# Patient Record
Sex: MALE | Race: WHITE | Employment: FULL TIME | URBAN - NONMETROPOLITAN AREA
[De-identification: names, ages, dates, MRNs, and addresses within clinical notes are randomized per-mention and may not be internally consistent; named-entity substitution may affect disease eponyms.]

---

## 2018-01-25 ENCOUNTER — AMBULATORY - GICH (OUTPATIENT)
Dept: SCHEDULING | Facility: OTHER | Age: 33
End: 2018-01-25

## 2018-02-08 ENCOUNTER — TRANSFERRED RECORDS (OUTPATIENT)
Dept: HEALTH INFORMATION MANAGEMENT | Facility: OTHER | Age: 33
End: 2018-02-08

## 2018-02-19 ENCOUNTER — TRANSFERRED RECORDS (OUTPATIENT)
Dept: HEALTH INFORMATION MANAGEMENT | Facility: OTHER | Age: 33
End: 2018-02-19

## 2018-03-05 ENCOUNTER — TRANSFERRED RECORDS (OUTPATIENT)
Dept: HEALTH INFORMATION MANAGEMENT | Facility: OTHER | Age: 33
End: 2018-03-05

## 2018-03-09 ENCOUNTER — DOCUMENTATION ONLY (OUTPATIENT)
Dept: FAMILY MEDICINE | Facility: OTHER | Age: 33
End: 2018-03-09

## 2018-03-09 PROBLEM — G47.00 INSOMNIA: Status: ACTIVE | Noted: 2018-03-09

## 2018-06-28 ENCOUNTER — TRANSFERRED RECORDS (OUTPATIENT)
Dept: HEALTH INFORMATION MANAGEMENT | Facility: OTHER | Age: 33
End: 2018-06-28

## 2018-07-02 ENCOUNTER — HOSPITAL ENCOUNTER (OUTPATIENT)
Dept: MRI IMAGING | Facility: OTHER | Age: 33
Discharge: HOME | End: 2018-07-02
Attending: PODIATRIST | Admitting: PODIATRIST
Payer: COMMERCIAL

## 2018-07-02 DIAGNOSIS — M79.672 FOOT PAIN, LEFT: ICD-10-CM

## 2018-07-02 PROCEDURE — 73720 MRI LWR EXTREMITY W/O&W/DYE: CPT | Mod: LT

## 2018-07-02 PROCEDURE — 25500064 ZZH RX 255 OP 636: Performed by: PODIATRIST

## 2018-07-02 PROCEDURE — A9575 INJ GADOTERATE MEGLUMI 0.1ML: HCPCS | Performed by: PODIATRIST

## 2018-07-02 RX ORDER — GADOTERATE MEGLUMINE 376.9 MG/ML
20 INJECTION INTRAVENOUS ONCE
Status: COMPLETED | OUTPATIENT
Start: 2018-07-02 | End: 2018-07-02

## 2018-07-02 RX ADMIN — GADOTERATE MEGLUMINE 16 ML: 376.9 INJECTION INTRAVENOUS at 13:24

## 2018-07-06 ENCOUNTER — TRANSFERRED RECORDS (OUTPATIENT)
Dept: HEALTH INFORMATION MANAGEMENT | Facility: OTHER | Age: 33
End: 2018-07-06

## 2018-09-23 ENCOUNTER — OFFICE VISIT (OUTPATIENT)
Dept: FAMILY MEDICINE | Facility: OTHER | Age: 33
End: 2018-09-23
Attending: FAMILY MEDICINE
Payer: COMMERCIAL

## 2018-09-23 VITALS
BODY MASS INDEX: 26.21 KG/M2 | TEMPERATURE: 98.2 F | HEART RATE: 72 BPM | SYSTOLIC BLOOD PRESSURE: 138 MMHG | DIASTOLIC BLOOD PRESSURE: 78 MMHG | WEIGHT: 187.9 LBS

## 2018-09-23 DIAGNOSIS — B37.2 YEAST DERMATITIS: ICD-10-CM

## 2018-09-23 PROCEDURE — 99213 OFFICE O/P EST LOW 20 MIN: CPT | Performed by: FAMILY MEDICINE

## 2018-09-23 ASSESSMENT — ANXIETY QUESTIONNAIRES
GAD7 TOTAL SCORE: 1
IF YOU CHECKED OFF ANY PROBLEMS ON THIS QUESTIONNAIRE, HOW DIFFICULT HAVE THESE PROBLEMS MADE IT FOR YOU TO DO YOUR WORK, TAKE CARE OF THINGS AT HOME, OR GET ALONG WITH OTHER PEOPLE: SOMEWHAT DIFFICULT
3. WORRYING TOO MUCH ABOUT DIFFERENT THINGS: NOT AT ALL
2. NOT BEING ABLE TO STOP OR CONTROL WORRYING: NOT AT ALL
7. FEELING AFRAID AS IF SOMETHING AWFUL MIGHT HAPPEN: NOT AT ALL
5. BEING SO RESTLESS THAT IT IS HARD TO SIT STILL: SEVERAL DAYS
1. FEELING NERVOUS, ANXIOUS, OR ON EDGE: NOT AT ALL
6. BECOMING EASILY ANNOYED OR IRRITABLE: NOT AT ALL

## 2018-09-23 ASSESSMENT — PATIENT HEALTH QUESTIONNAIRE - PHQ9: 5. POOR APPETITE OR OVEREATING: NOT AT ALL

## 2018-09-23 ASSESSMENT — PAIN SCALES - GENERAL: PAINLEVEL: NO PAIN (0)

## 2018-09-23 NOTE — PROGRESS NOTES
SUBJECTIVE:   Adan Daugherty is a 33 year old male who presents to clinic today for the following health issues: Rash patient arrives here for rash.    HPI Comments: Noticed that a couple days ago after working.  He states that he was in an environment where he was constantly wet.  It was hot.  Working at the [employer] and slowly developed a rash underneath his armpit and along his right waistband.  He used antifungal cream once and Benadryl cream a couple times.  Itchy.        Patient Active Problem List    Diagnosis Date Noted     Insomnia 03/09/2018     Priority: Medium     Acid reflux 08/20/2013     Priority: Medium     Abdominal pain, acute 02/14/2012     Priority: Medium     Anxiety state 07/07/2011     Priority: Medium     Otitis media, chronic 07/07/2011     Priority: Medium     Posttraumatic stress disorder 07/07/2011     Priority: Medium     Seborrhea capitis 07/07/2011     Priority: Medium     Backache 10/25/2010     Priority: Medium     Pain in joint, lower leg 10/25/2010     Priority: Medium     Nonallopathic lesion of pelvic region 10/25/2010     Priority: Medium     Nonallopathic lesion of lumbar region 10/25/2010     Priority: Medium     Nonallopathic lesion of thoracic region 10/25/2010     Priority: Medium     Past Medical History:   Diagnosis Date     Pain in joint, lower leg     hx of     Personal history of other specified diseases     No Comments Provided      Past Surgical History:   Procedure Laterality Date     OTHER SURGICAL HISTORY      [id number],MASTOIDECTOMY,and reconstruction, multiple ear surgeries     No current outpatient prescriptions on file.     Allergies   Allergen Reactions     Amoxicillin Unknown     Penicillins Unknown       Review of Systems     OBJECTIVE:     /78 (BP Location: Right arm, Patient Position: Sitting, Cuff Size: Adult Regular)  Pulse 72  Temp 98.2  F (36.8  C) (Tympanic)  Wt 187 lb 14.4 oz (85.2 kg)  BMI 26.21 kg/m2  Body mass index is 26.21  kg/(m^2).  Physical Exam   Skin:   Areas of erythema with punctate lesions underneath both axilla and around his right waist       none     ASSESSMENT/PLAN:         1. Yeast dermatitis  Exam is most consistent with yeast dermatitis especially in light of his history.  Recommended antifungal cream.  Cautiously use hydrocortisone cream.  Follow-up if getting worse        Brandt Linda MD  [facility] AND \[facility]\""

## 2018-09-23 NOTE — MR AVS SNAPSHOT
After Visit Summary   9/23/2018    Adan Daugherty    MRN: [id number]           Patient Information     Date Of Birth          1985        Visit Information        Provider Department      9/23/2018 10:15 AM Brandt Linda MD [facility]        Today's Diagnoses     Yeast dermatitis           Follow-ups after your visit        Who to contact     If you have questions or need follow up information about today's clinic visit or your schedule please contact [facility] directly at [phone].  Normal or non-critical lab and imaging results will be communicated to you by MyChart, letter or phone within 4 business days after the clinic has received the results. If you do not hear from us within 7 days, please contact the clinic through MyChart or phone. If you have a critical or abnormal lab result, we will notify you by phone as soon as possible.  Submit refill requests through [email or web address] or call your pharmacy and they will forward the refill request to us. Please allow 3 business days for your refill to be completed.          Additional Information About Your Visit        Care EveryWhere ID     This is your Care EveryWhere ID. This could be used by other organizations to access your [facility] medical records  [id number]        Your Vitals Were     Pulse Temperature BMI (Body Mass Index)             72 98.2  F (36.8  C) (Tympanic) 26.21 kg/m2          Blood Pressure from Last 3 Encounters:   09/23/18 138/78   01/05/16 138/86   08/16/15 131/90    Weight from Last 3 Encounters:   09/23/18 187 lb 14.4 oz (85.2 kg)   01/05/16 189 lb (85.7 kg)   08/16/15 196 lb (88.9 kg)              Today, you had the following     No orders found for display       Primary Care Provider Office Phone # Fax #    Dewayne Schofield [phone] [address] [place] [facility] [address]        Equal Access to Services     MADELEINE ESTRELLA AH: Lenka aleman  mansoor Zepeda, araseli guzmán, leandro nissadanay padminisia, arianne reemain hayaaanahi washingtoneileen felipetyler laKofibridger shaun. So [facility] [phone].    ATENCIÓN: Si habla español, tiene a matos disposición servicios gratuitos de asistencia lingüística. Llame al [phone].    We comply with applicable federal civil rights laws and [place] laws. We do not discriminate on the basis of race, color, national origin, age, disability, sex, sexual orientation, or gender identity.            Thank you!     Thank you for choosing [facility] AND [facility]  for your care. Our goal is always to provide you with excellent care. Hearing back from our patients is one way we can continue to improve our services. Please take a few minutes to complete the written survey that you may receive in the mail after your visit with us. Thank you!             Your Updated Medication List - Protect others around you: Learn how to safely use, store and throw away your medicines at www.disposemymeds.org.      Notice  As of 9/23/2018 10:52 AM    You have not been prescribed any medications.

## 2018-09-24 ASSESSMENT — ANXIETY QUESTIONNAIRES: GAD7 TOTAL SCORE: 1

## 2018-09-25 ENCOUNTER — OFFICE VISIT (OUTPATIENT)
Dept: FAMILY MEDICINE | Facility: OTHER | Age: 33
End: 2018-09-25
Attending: NURSE PRACTITIONER
Payer: COMMERCIAL

## 2018-09-25 VITALS
TEMPERATURE: 97.3 F | BODY MASS INDEX: 27.16 KG/M2 | RESPIRATION RATE: 16 BRPM | HEART RATE: 83 BPM | HEIGHT: 70 IN | WEIGHT: 189.7 LBS | OXYGEN SATURATION: 98 %

## 2018-09-25 DIAGNOSIS — B37.2 YEAST DERMATITIS: Primary | ICD-10-CM

## 2018-09-25 PROCEDURE — 99213 OFFICE O/P EST LOW 20 MIN: CPT | Performed by: NURSE PRACTITIONER

## 2018-09-25 RX ORDER — NYSTATIN 100000 [USP'U]/G
POWDER TOPICAL 2 TIMES DAILY
Qty: 60 G | Refills: 0 | Status: SHIPPED | OUTPATIENT
Start: 2018-09-25 | End: 2019-04-22

## 2018-09-25 RX ORDER — FLUCONAZOLE 150 MG/1
150 TABLET ORAL
Qty: 2 TABLET | Refills: 0 | Status: SHIPPED | OUTPATIENT
Start: 2018-09-25 | End: 2019-04-22

## 2018-09-25 RX ORDER — NYSTATIN AND TRIAMCINOLONE ACETONIDE 100000; 1 [USP'U]/G; MG/G
OINTMENT TOPICAL 2 TIMES DAILY
Qty: 90 G | Refills: 1 | Status: SHIPPED | OUTPATIENT
Start: 2018-09-25 | End: 2019-04-22

## 2018-09-25 ASSESSMENT — PAIN SCALES - GENERAL: PAINLEVEL: NO PAIN (0)

## 2018-09-25 NOTE — MR AVS SNAPSHOT
After Visit Summary   9/25/2018    Adan Daugherty    MRN: [id number]           Patient Information     Date Of Birth          1985        Visit Information        Provider Department      9/25/2018 5:30 PM [facility] NURSE  [facility]        Today's Diagnoses     Yeast dermatitis    -  1      Care Instructions      Candida Skin Infection (Adult)  Candida is type of yeast. It grows naturally on the skin and in the mouth. If it grows out of control, it can cause an infection. Candida can cause infections in the genital area, skin folds, in the mouth, and under the breasts. Anyone can get this infection. It is more common in a person with a weak immune system, such as from diabetes, HIV, or cancer. It s also more common in someone who has been on antibiotic therapy. And it s more common people who are overweight or who have incontinence. Wearing tight-fitting clothing and taking part in activities with lots of skin-to-skin contact can also put you at risk.  Candida causes the skin to become bright red and inflamed. The border of the infected part of the skin is often raised. The infection causes pain and itching. Sometimes the skin peels and bleeds. In the mouth, candida is called thrush, and may cause white thickened areas.  A Candida rash is most often treated with an antifungal cream or ointment. The rash will clear a few days after starting the medicine. Infections that don t go away may need a prescription medicine. In rare cases, a bacterial infection can also occur.  Home care  Your healthcare provider will recommend an antifungal cream or ointment for the rash. He or she may also prescribe a medicine for the itch. Follow all instructions for using these medicines. Don t use cornstarch powder. Cornstarch can cause the Candida infection to get worse.  General care:    Keep your skin clean by washing the area twice a day.    Use the cream as directed until your rash is gone.  "Once the skin has healed, keep it dry to prevent another infection.     If you are overweight, talk with your healthcare provider about a plan to lose excess weight.    Avoid clothes that fit tightly.  Follow-up care  Follow up with your healthcare provider, or as advised. Your rash will clear in 7 to 14 days. Call your healthcare provider if the rash is not gone after 14 days.  When to seek medical advice  Call your healthcare provider right away if any of these occur:    Pain or redness that gets worse or spreads    Fluid coming from the skin    Yellow crusts on the skin    Fever of 100.4 F (38 C) or higher, or as directed by your healthcare provider                  Follow-ups after your visit        Who to contact     If you have questions or need follow up information about today's clinic visit or your schedule please contact [facility] directly at [phone].  Normal or non-critical lab and imaging results will be communicated to you by MyChart, letter or phone within 4 business days after the clinic has received the results. If you do not hear from us within 7 days, please contact the clinic through MyChart or phone. If you have a critical or abnormal lab result, we will notify you by phone as soon as possible.  Submit refill requests through [employer] or call your pharmacy and they will forward the refill request to us. Please allow 3 business days for your refill to be completed.          Additional Information About Your Visit        Care EveryWhere ID     This is your Care EveryWhere ID. This could be used by other organizations to access your [facility] medical records  [id number]        Your Vitals Were     Pulse Temperature Respirations Height Pulse Oximetry BMI (Body Mass Index)    83 97.3  F (36.3  C) (Tympanic) 16 5' 10.08\" (1.78 m) 98% 27.16 kg/m2       Blood Pressure from Last 3 Encounters:   09/23/18 138/78   01/05/16 138/86   08/16/15 131/90    Weight from Last 3 " Encounters:   09/25/18 189 lb 11.2 oz (86 kg)   09/23/18 187 lb 14.4 oz (85.2 kg)   01/05/16 189 lb (85.7 kg)              Today, you had the following     No orders found for display         Today's Medication Changes          These changes are accurate as of 9/25/18  6:02 PM.  If you have any questions, ask your nurse or doctor.               Start taking these medicines.        Dose/Directions    fluconazole 150 MG tablet   Commonly known as:  DIFLUCAN   Used for:  Yeast dermatitis        Dose:  150 mg   Take 1 tablet (150 mg) by mouth every 3 days   Quantity:  2 tablet   Refills:  0       nystatin 884629 UNIT/GM Powd   Commonly known as:  MYCOSTATIN   Used for:  Yeast dermatitis        Apply topically 2 times daily   Quantity:  60 g   Refills:  0       nystatin-triamcinolone ointment   Commonly known as:  MYCOLOG   Used for:  Yeast dermatitis        Apply topically 2 times daily For around 10 days.   Quantity:  90 g   Refills:  1            Where to get your medicines      These medications were sent to [id number] - [place], [place] - [address]  [address] [address]     Phone:  [phone]     fluconazole 150 MG tablet    nystatin [id number] UNIT/GM Powd    nystatin-triamcinolone ointment                Primary Care Provider Office Phone # Fax #    Dewayne Schofield [phone] [address] [facility] [address]        Equal Access to Services     [facility]: Hadii jose antonio rodriguezo Sorossali, waaxda luqadaha, qaybta kaalmada adeegyada, arianne dunn. So [facility] [phone].    ATENCIÓN: Si habla jeremy, tiene a matos disposición servicios gratuitos de asistencia lingüística. Julisa al [phone].    We comply with applicable federal civil rights laws and [place] laws. We do not discriminate on the basis of race, color, national origin, age, disability, sex, sexual orientation, or gender  identity.            Thank you!     Thank you for choosing [facility] AND [facility]  for your care. Our goal is always to provide you with excellent care. Hearing back from our patients is one way we can continue to improve our services. Please take a few minutes to complete the written survey that you may receive in the mail after your visit with us. Thank you!             Your Updated Medication List - Protect others around you: Learn how to safely use, store and throw away your medicines at www.disposemymeds.org.          This list is accurate as of 9/25/18  6:02 PM.  Always use your most recent med list.                   Brand Name Dispense Instructions for use Diagnosis    fluconazole 150 MG tablet    DIFLUCAN    2 tablet    Take 1 tablet (150 mg) by mouth every 3 days    Yeast dermatitis       nystatin [id number] UNIT/GM Powd    MYCOSTATIN    60 g    Apply topically 2 times daily    Yeast dermatitis       nystatin-triamcinolone ointment    MYCOLOG    90 g    Apply topically 2 times daily For around 10 days.    Yeast dermatitis

## 2018-09-25 NOTE — NURSING NOTE
Patient presents with rash on arms and waist. Patient saw Brandt Linda MD on the 23rd and was told it is fungal. Patient states it is itching more, spreading, redder. Patient has been using [facility] brand antifungal with no relief. Aliya Malone LPN .............9/25/2018  5:21 PM  Medication Reconciliation: complete    Aliya Malone LPN  ..................9/25/2018   5:21 PM

## 2018-09-25 NOTE — PROGRESS NOTES
"Nursing Notes:   Aliya Malone LPN  9/25/2018  5:22 PM  Unsigned  Patient presents with rash on arms and waist. Patient saw Brandt Linda MD on the 23rd and was told it is fungal. Patient states it is itching more, spreading, redder. Patient has been using [facility] brand antifungal with no relief. Aliya Malone LPN .............9/25/2018  5:21 PM  Medication Reconciliation: complete    Aliya Malone LPN  ..................9/25/2018   5:21 PM    SUBJECTIVE:   Adan Daugherty is a 33 year old male who presents to clinic today for the following health issues:    Rash      Duration: 5 days    Description  Location: Under arms, in groins, and over hips.   Itching: moderate    Intensity:  moderate    Accompanying signs and symptoms: Itching. Getting worse.     History (similar episodes/previous evaluation): None    Precipitating or alleviating factors:  New exposures:  None  Recent travel: no      Therapies tried and outcome: hydrocortisone cream -  not effective and Antifungal: not effective.       Problem list and histories reviewed & adjusted, as indicated.  Additional history: as documented    Current Outpatient Prescriptions   Medication Sig Dispense Refill     fluconazole (DIFLUCAN) 150 MG tablet Take 1 tablet (150 mg) by mouth every 3 days 2 tablet 0     nystatin (MYCOSTATIN) 115321 UNIT/GM POWD Apply topically 2 times daily 60 g 0     nystatin-triamcinolone (MYCOLOG) ointment Apply topically 2 times daily For around 10 days. 90 g 1     Allergies   Allergen Reactions     Amoxicillin Unknown     Penicillins Unknown         ROS:  Notable findings in the HPI.       OBJECTIVE:     Pulse 83  Temp 97.3  F (36.3  C) (Tympanic)  Resp 16  Ht 5' 10.08\" (1.78 m)  Wt 189 lb 11.2 oz (86 kg)  SpO2 98%  BMI 27.16 kg/m2  Body mass index is 27.16 kg/(m^2).  GENERAL: healthy, alert and no distress  EYES: Eyes grossly normal to inspection  HENT: normal cephalic/atraumatic and oral mucous membranes " moist  RESP: without increased work of breathing.   SKIN: Examination of the rash reveals: Candida: Patches of flat, intensely inflamed, well-defined, clustered bright red macules under the arm pits, over lower abd, and in groins  PSYCH: mentation appears normal, affect normal/bright    Diagnostic Test Results:  none     ASSESSMENT/PLAN:     1. Yeast dermatitis  - fluconazole (DIFLUCAN) 150 MG tablet; Take 1 tablet (150 mg) by mouth every 3 days  Dispense: 2 tablet; Refill: 0  - nystatin (MYCOSTATIN) 293747 UNIT/GM POWD; Apply topically 2 times daily  Dispense: 60 g; Refill: 0  - nystatin-triamcinolone (MYCOLOG) ointment; Apply topically 2 times daily For around 10 days.  Dispense: 90 g; Refill: 1    PLAN:    Rash:  moisturizer  Reassurance was given to the patient  Rx  F/U in 72 hours if not improving could be more contact derm under arms. But will treat as yeast as this fits his job and past exam note.     Followup:    If not improving or if condition worsens, follow up with your Primary Care Provider    I explained my diagnostic considerations and recommendations to the patient, who voiced understanding and agreement with the treatment plan. All questions were answered. We discussed potential side effects of any prescribed or recommended therapies, as well as expectations for response to treatments. He was advised to contact our office if there is no improvement or worsening of conditions or symptoms.  If s/s worsen or persist, patient will either come back or follow up with PCP.    Disclaimer:  This note consists of words and symbols derived from keyboarding, dictation, or using voice recognition software. As a result, there may be errors in the script that have gone undetected. Please consider this when interpreting information found in this note.      Kacie Ag NP, 9/25/2018 5:26 PM

## 2018-09-30 ENCOUNTER — OFFICE VISIT (OUTPATIENT)
Dept: FAMILY MEDICINE | Facility: OTHER | Age: 33
End: 2018-09-30
Payer: COMMERCIAL

## 2018-09-30 VITALS
TEMPERATURE: 97.5 F | OXYGEN SATURATION: 98 % | BODY MASS INDEX: 27.39 KG/M2 | DIASTOLIC BLOOD PRESSURE: 64 MMHG | SYSTOLIC BLOOD PRESSURE: 150 MMHG | WEIGHT: 191.3 LBS | HEART RATE: 44 BPM

## 2018-09-30 DIAGNOSIS — H72.92 RUPTURE OF LEFT TYMPANIC MEMBRANE: Primary | ICD-10-CM

## 2018-09-30 PROCEDURE — 99213 OFFICE O/P EST LOW 20 MIN: CPT | Performed by: NURSE PRACTITIONER

## 2018-09-30 RX ORDER — CIPROFLOXACIN AND DEXAMETHASONE 3; 1 MG/ML; MG/ML
4 SUSPENSION/ DROPS AURICULAR (OTIC) 2 TIMES DAILY
Qty: 1 BOTTLE | Refills: 0 | Status: SHIPPED | OUTPATIENT
Start: 2018-09-30 | End: 2018-10-07

## 2018-09-30 ASSESSMENT — PAIN SCALES - GENERAL: PAINLEVEL: SEVERE PAIN (6)

## 2018-09-30 NOTE — PROGRESS NOTES
Nursing Notes:   Rachel Perez CMA  9/30/2018 12:09 PM  Addendum  Patient presents to clinic today for a possible ear infection. Patient was swimming last night. Patient believes he perforated his ear drum. He has an artificial ear drum.  Rachel Perez CMA..............9/30/2018........12:06 PM    Medication Reconciliation: complete    Rachel Perez CMA        SUBJECTIVE:   Adan Daugherty is a 33 year old male who presents to clinic today for the following health issues:    Ear pain      Duration: Yesterday, last night    Description  ear pain left    Severity: moderate    Accompanying signs and symptoms: no fevers or chills, has drainage.     History (predisposing factors):  history of recurrent otitis media and HX of TM repair for perforation     Precipitating or alleviating factors: None    Therapies tried and outcome:  none        Problem list and histories reviewed & adjusted, as indicated.  Additional history: as documented    Current Outpatient Prescriptions   Medication Sig Dispense Refill     ciprofloxacin-dexamethasone (CIPRODEX) otic suspension Place 4 drops Into the left ear 2 times daily for 7 days 1 Bottle 0     fluconazole (DIFLUCAN) 150 MG tablet Take 1 tablet (150 mg) by mouth every 3 days 2 tablet 0     nystatin (MYCOSTATIN) 204439 UNIT/GM POWD Apply topically 2 times daily 60 g 0     nystatin-triamcinolone (MYCOLOG) ointment Apply topically 2 times daily For around 10 days. 90 g 1     Allergies   Allergen Reactions     Amoxicillin Unknown     Penicillins Unknown         ROS:  Notable findings in the HPI.       OBJECTIVE:     /64  Pulse (!) 44  Temp 97.5  F (36.4  C) (Tympanic)  Wt 191 lb 4.8 oz (86.8 kg)  SpO2 98%  BMI 27.39 kg/m2  Body mass index is 27.39 kg/(m^2).  GENERAL: healthy, alert and no distress  EYES: Eyes grossly normal to inspection  HENT: normal cephalic/atraumatic, right ear: normal: no effusions, no erythema, normal landmarks, left ear: erythematous and  perforation, nose and mouth without ulcers or lesions, oropharynx clear and oral mucous membranes moist  NECK: no adenopathy  RESP: without increased work of breathing.     Diagnostic Test Results:  none     ASSESSMENT/PLAN:     1. Rupture of left tympanic membrane  - ciprofloxacin-dexamethasone (CIPRODEX) otic suspension; Place 4 drops Into the left ear 2 times daily for 7 days  Dispense: 1 Bottle; Refill: 0    PLAN:    URI Adult:  Tylenol, Ibuprofen, Fluids, Rest and RX drops as directed.     Followup:    If not improving or if condition worsens, follow up with your Primary Care Provider    I explained my diagnostic considerations and recommendations to the patient, who voiced understanding and agreement with the treatment plan. All questions were answered. We discussed potential side effects of any prescribed or recommended therapies, as well as expectations for response to treatments. He was advised to contact our office if there is no improvement or worsening of conditions or symptoms.  If s/s worsen or persist, patient will either come back or follow up with PCP.    Disclaimer:  This note consists of words and symbols derived from keyboarding, dictation, or using voice recognition software. As a result, there may be errors in the script that have gone undetected. Please consider this when interpreting information found in this note.      Kacie Ag NP, 9/30/2018 12:25 PM

## 2018-09-30 NOTE — PATIENT INSTRUCTIONS
Eardrum Rupture (Perforation)    Your eardrum is a thin membrane between your outer and middle ear. Sound waves entering your ear cause the membrane to vibrate. This helps you hear. An injury or infection can cause your eardrum to tear (rupture). This creates a hole (perforation) that may affect your hearing.  Causes of eardrum perforation  Causes of a ruptured eardrum include:    Pressure from an ear infection    Putting an object such as a cotton swab or pencil into the ear    A very loud noise such as a gunshot close to the ear    Rapid changes in air pressure. These can happen during scuba diving or traveling at high altitudes.    A slap or blow to the ear  When to go to the emergency room (ER)  Seek medical care right away if you:    Have severe pain, bleeding, or ringing in your ear.    Lose your hearing suddenly.    Become very dizzy for no reason.    Have an object lodged in your ear.  A ruptured eardrum from an ear infection usually isn't an emergency. In fact, the rupture often relieves pressure and pain. It usually heals within hours or days. But you should have the ear looked at by a healthcare provider within 24 hours.  What to expect in the ER  Your ear will be examined. Treatment will depend on how severe the damage is. Small holes often heal on their own. A small patch may be placed over a minor eardrum tear. Large tears may need to be repaired during an operation. If you are very dizzy or have severe hearing loss, you are likely to stay in the hospital for treatment for one or more days.  Don't clean inside the ear canal with cotton swabs or any other object.

## 2018-09-30 NOTE — NURSING NOTE
Patient presents to clinic today for a possible ear infection. Patient was swimming last night. Patient believes he perforated his ear drum. He has an artificial ear drum.  Rachel Perez CMA..............9/30/2018........12:06 PM    Medication Reconciliation: complete    Rachel Perez CMA

## 2018-09-30 NOTE — MR AVS SNAPSHOT
After Visit Summary   9/30/2018    Adan Daugherty    MRN: [id number]           Patient Information     Date Of Birth          1985        Visit Information        Provider Department      9/30/2018 12:00 PM Kacie Ag NP [facility]        Today's Diagnoses     Rupture of left tympanic membrane    -  1      Care Instructions      Eardrum Rupture (Perforation)    Your eardrum is a thin membrane between your outer and middle ear. Sound waves entering your ear cause the membrane to vibrate. This helps you hear. An injury or infection can cause your eardrum to tear (rupture). This creates a hole (perforation) that may affect your hearing.  Causes of eardrum perforation  Causes of a ruptured eardrum include:    Pressure from an ear infection    Putting an object such as a cotton swab or pencil into the ear    A very loud noise such as a gunshot close to the ear    Rapid changes in air pressure. These can happen during scuba diving or traveling at high altitudes.    A slap or blow to the ear  When to go to the emergency room (ER)  Seek medical care right away if you:    Have severe pain, bleeding, or ringing in your ear.    Lose your hearing suddenly.    Become very dizzy for no reason.    Have an object lodged in your ear.  A ruptured eardrum from an ear infection usually isn't an emergency. In fact, the rupture often relieves pressure and pain. It usually heals within hours or days. But you should have the ear looked at by a healthcare provider within 24 hours.  What to expect in the ER  Your ear will be examined. Treatment will depend on how severe the damage is. Small holes often heal on their own. A small patch may be placed over a minor eardrum tear. Large tears may need to be repaired during an operation. If you are very dizzy or have severe hearing loss, you are likely to stay in the hospital for treatment for one or more days.  Don't clean inside the ear canal with  "cotton swabs or any other object.                   Follow-ups after your visit        Who to contact     If you have questions or need follow up information about today's clinic visit or your schedule please contact [facility] directly at [phone].  Normal or non-critical lab and imaging results will be communicated to you by [employer], letter or phone within 4 business days after the clinic has received the results. If you do not hear from us within 7 days, please contact the clinic through [employer] or phone. If you have a critical or abnormal lab result, we will notify you by phone as soon as possible.  Submit refill requests through [employer] or call your pharmacy and they will forward the refill request to us. Please allow 3 business days for your refill to be completed.          Additional Information About Your Visit        [employer] Information     [employer] lets you send messages to your doctor, view your test results, renew your prescriptions, schedule appointments and more. To sign up, go to [email or web address] . Click on \"Log in\" on the left side of the screen, which will take you to the Welcome page. Then click on \"Sign up Now\" on the right side of the page.     You will be asked to enter the access code listed below, as well as some personal information. Please follow the directions to create your username and password.     Your access code is: [email or web address]  Expires: 2018 12:34 PM     Your access code will  in 90 days. If you need help or a new code, please call your [facility] clinic or [phone].        Care EveryWhere ID     This is your Care EveryWhere ID. This could be used by other organizations to access your [facility] medical records  [id number]        Your Vitals Were     Pulse Temperature Pulse Oximetry BMI (Body Mass Index)          44 97.5  F (36.4  C) (Tympanic) 98% 27.39 kg/m2         Blood Pressure from Last 3 Encounters:   18 150/64   18 " 138/78   01/05/16 138/86    Weight from Last 3 Encounters:   09/30/18 191 lb 4.8 oz (86.8 kg)   09/25/18 189 lb 11.2 oz (86 kg)   09/23/18 187 lb 14.4 oz (85.2 kg)              Today, you had the following     No orders found for display         Today's Medication Changes          These changes are accurate as of 9/30/18 12:34 PM.  If you have any questions, ask your nurse or doctor.               Start taking these medicines.        Dose/Directions    ciprofloxacin-dexamethasone otic suspension   Commonly known as:  CIPRODEX   Used for:  Rupture of left tympanic membrane   Started by:  Kacei Ag NP        Dose:  4 drop   Place 4 drops Into the left ear 2 times daily for 7 days   Quantity:  1 Bottle   Refills:  0            Where to get your medicines      These medications were sent to [id number] - [place], [place] - [address]  [address], [facility] [address]     Phone:  [phone]     ciprofloxacin-dexamethasone otic suspension                Primary Care Provider Office Phone # Fax #    Dewayne Schofield [phone]       [address]  [facility] [address]        Equal Access to Services     MADELEINE ESTRELLA AH: Hadii jose antonio aleman hadasho Soomaali, waaxda luqadaha, qaybta kaalmada adeegyada, arianne dunn. So [facility] [phone].    ATENCIÓN: Si habla español, tiene a matos disposición servicios gratuitos de asistencia lingüística. Llame al [phone].    We comply with applicable federal civil rights laws and [place] laws. We do not discriminate on the basis of race, color, national origin, age, disability, sex, sexual orientation, or gender identity.            Thank you!     Thank you for choosing [facility]  for your care. Our goal is always to provide you with excellent care. Hearing back from our patients is one way we can continue to improve our services. Please take a few  minutes to complete the written survey that you may receive in the mail after your visit with us. Thank you!             Your Updated Medication List - Protect others around you: Learn how to safely use, store and throw away your medicines at www.disposemymeds.org.          This list is accurate as of 9/30/18 12:34 PM.  Always use your most recent med list.                   Brand Name Dispense Instructions for use Diagnosis    ciprofloxacin-dexamethasone otic suspension    CIPRODEX    1 Bottle    Place 4 drops Into the left ear 2 times daily for 7 days    Rupture of left tympanic membrane       fluconazole 150 MG tablet    DIFLUCAN    2 tablet    Take 1 tablet (150 mg) by mouth every 3 days    Yeast dermatitis       nystatin 809170 UNIT/GM Powd    MYCOSTATIN    60 g    Apply topically 2 times daily    Yeast dermatitis       nystatin-triamcinolone ointment    MYCOLOG    90 g    Apply topically 2 times daily For around 10 days.    Yeast dermatitis

## 2019-04-22 ENCOUNTER — OFFICE VISIT (OUTPATIENT)
Dept: FAMILY MEDICINE | Facility: OTHER | Age: 34
End: 2019-04-22
Attending: NURSE PRACTITIONER
Payer: COMMERCIAL

## 2019-04-22 VITALS
HEART RATE: 48 BPM | RESPIRATION RATE: 14 BRPM | HEIGHT: 71 IN | WEIGHT: 191.5 LBS | TEMPERATURE: 97.2 F | BODY MASS INDEX: 26.81 KG/M2 | SYSTOLIC BLOOD PRESSURE: 132 MMHG | DIASTOLIC BLOOD PRESSURE: 82 MMHG

## 2019-04-22 DIAGNOSIS — R23.8 SKIN IRRITATION: Primary | ICD-10-CM

## 2019-04-22 PROCEDURE — 99214 OFFICE O/P EST MOD 30 MIN: CPT | Performed by: NURSE PRACTITIONER

## 2019-04-22 RX ORDER — FLUCONAZOLE 150 MG/1
TABLET ORAL
Qty: 2 TABLET | Refills: 0 | Status: SHIPPED | OUTPATIENT
Start: 2019-04-22 | End: 2019-07-30

## 2019-04-22 ASSESSMENT — ENCOUNTER SYMPTOMS
MUSCULOSKELETAL NEGATIVE: 1
CONSTITUTIONAL NEGATIVE: 1
GASTROINTESTINAL NEGATIVE: 1

## 2019-04-22 ASSESSMENT — MIFFLIN-ST. JEOR: SCORE: 1835.77

## 2019-04-22 ASSESSMENT — PAIN SCALES - GENERAL: PAINLEVEL: NO PAIN (0)

## 2019-04-23 NOTE — PATIENT INSTRUCTIONS
Apply topical OTC antifungal cream, then apply Aquaphor ointment over the top of this 2 times a day x 10 days.   F/u with PCP in 5-7 days if not improving or sooner if symptoms worsen.

## 2019-04-23 NOTE — NURSING NOTE
Patient presents to the clinic for rash located to arms, waist and groin.     Medication Reconciliation: complete   Ailin Mcbride LPN............. April 22, 2019 7:03 PM

## 2019-05-14 NOTE — PROGRESS NOTES
SUBJECTIVE:   Adan Daugherty is a 34 year old male who presents to clinic today for the following health issues:    HPI   Presents with a rash to his axilla, groin and hip since yesterday. Had been 5 days since swimming and staying in the hotes. Had a similar rash last year after going to the same hotel. Applied antifungal treatments at home. No fever, open areas or drainage.     Patient Active Problem List    Diagnosis Date Noted     Yeast dermatitis 2018     Priority: Medium     Insomnia 2018     Priority: Medium     Acid reflux 2013     Priority: Medium     Anxiety state 2011     Priority: Medium     Otitis media, chronic 2011     Priority: Medium     Posttraumatic stress disorder 2011     Priority: Medium     Seborrhea capitis 2011     Priority: Medium     Nonallopathic lesion of pelvic region 10/25/2010     Priority: Medium     Nonallopathic lesion of lumbar region 10/25/2010     Priority: Medium     Nonallopathic lesion of thoracic region 10/25/2010     Priority: Medium     Past Medical History:   Diagnosis Date     Pain in joint, lower leg     hx of     Personal history of other specified diseases     No Comments Provided      Past Surgical History:   Procedure Laterality Date     OTHER SURGICAL HISTORY      [id number],MASTOIDECTOMY,and reconstruction, multiple ear surgeries     Family History   Problem Relation Age of Onset     Other - See Comments Father         Psychiatric illness,bipolar     Social History     Tobacco Use     Smoking status: Former Smoker     Packs/day: 1.00     Years: 8.00     Pack years: 8.00     Types: Cigarettes     Last attempt to quit: 2010     Years since quittin.8     Smokeless tobacco: Current User     Types: Chew     Tobacco comment: Quit smoking: Quit 3 years ago   Substance Use Topics     Alcohol use: Yes     Comment: occasional     Social History     Social History Narrative    Patient is a former smoker- smoked 10 yrs. Quit 1  "yr ago.     Regular Exercise - no.  \"physicallly active\"-- to [employer].  One child with her and one stepchild.  The stepchild biologic father has gotten custody.  His wife was sev    erely ill with blastomycosis spring of 2011.  She is recovering.  She had CNS involvement.     Current Outpatient Medications   Medication Sig Dispense Refill     fluconazole (DIFLUCAN) 150 MG tablet Take 1 tab now and 1 tab in 3 days 2 tablet 0       Review of Systems   Constitutional: Negative.    Gastrointestinal: Negative.    Genitourinary: Negative.    Musculoskeletal: Negative.    Skin: Positive for rash.        OBJECTIVE:     /82 (BP Location: Left arm, Patient Position: Sitting, Cuff Size: Adult Regular)   Pulse (!) 48   Temp 97.2  F (36.2  C) (Tympanic)   Resp 14   Ht 1.803 m (5' 11\")   Wt 86.9 kg (191 lb 8 oz)   BMI 26.71 kg/m    Body mass index is 26.71 kg/m .  Physical Exam   Constitutional: He appears well-developed and well-nourished.   HENT:   Head: Normocephalic and atraumatic.   Eyes: Conjunctivae are normal. No scleral icterus.   Cardiovascular: Normal rate.   Pulmonary/Chest: Effort normal.   Musculoskeletal: Normal range of motion.   Neurological: He is alert.   Skin: Skin is warm and intact. Rash noted. No abrasion, no ecchymosis and no petechiae noted. Rash is maculopapular (Fine, erythematous scattered in noted areas). He is not diaphoretic.        Rash has no odor, no moisture, no open lesions, no swelling.    Nursing note and vitals reviewed.      Diagnostic Test Results:  No results found for this or any previous visit (from the past 24 hour(s)).    ASSESSMENT/PLAN:       ICD-10-CM    1. Skin irritation R23.8 fluconazole (DIFLUCAN) 150 MG tablet     Patient's rash appears to like a contact dermatitis, although the locations are consistent with a fungal cause and he does have a history of yeast dermatitis.  Does not have a cellulitis appearance or fluctuance.  Will treat " with Diflucan and topical antifungals.  Advised patient to moisturize area as well.  Monitor if symptoms and follow-up with primary care if not improving.  Given epic education materials.    I explained my diagnostic considerations and recommendations to pt who voiced understanding and agreement with the treatment plan. All questions were answered. We discussed potential side effects of any prescribed or recommended therapies, as well as expectations for response to treatments.    Disclaimer:  This note consists of words and symbols derived from keyboarding, dictation, or using voice recognition software. As a result, there may be errors in the script that have gone undetected. Please consider this when interpreting information found in this note.      MEHRAN Guevara, NP-C  4/22/2019 at 6:58 PM  [facility]       no

## 2019-07-30 ENCOUNTER — OFFICE VISIT (OUTPATIENT)
Dept: FAMILY MEDICINE | Facility: OTHER | Age: 34
End: 2019-07-30
Attending: NURSE PRACTITIONER
Payer: COMMERCIAL

## 2019-07-30 VITALS
DIASTOLIC BLOOD PRESSURE: 90 MMHG | WEIGHT: 186 LBS | HEIGHT: 71 IN | BODY MASS INDEX: 26.04 KG/M2 | OXYGEN SATURATION: 97 % | TEMPERATURE: 98.6 F | RESPIRATION RATE: 16 BRPM | SYSTOLIC BLOOD PRESSURE: 138 MMHG | HEART RATE: 43 BPM

## 2019-07-30 DIAGNOSIS — H60.392 INFECTIVE OTITIS EXTERNA, LEFT: Primary | ICD-10-CM

## 2019-07-30 PROCEDURE — 99213 OFFICE O/P EST LOW 20 MIN: CPT | Performed by: NURSE PRACTITIONER

## 2019-07-30 RX ORDER — OFLOXACIN 3 MG/ML
5 SOLUTION AURICULAR (OTIC) 2 TIMES DAILY
Qty: 4 ML | Refills: 0 | Status: SHIPPED | OUTPATIENT
Start: 2019-07-30 | End: 2019-08-06

## 2019-07-30 ASSESSMENT — PAIN SCALES - GENERAL: PAINLEVEL: NO PAIN (1)

## 2019-07-30 ASSESSMENT — MIFFLIN-ST. JEOR: SCORE: 1802.88

## 2019-07-30 NOTE — PROGRESS NOTES
"Nursing Notes:   Essence Briggs LPN  7/30/2019  6:10 PM  Signed  Patient presents to clinic for left sided earache since Saturday. Has not used any medication for it .   Chief Complaint   Patient presents with     Ear Problem       Initial BP (!) 138/90 (BP Location: Left arm, Patient Position: Sitting, Cuff Size: Adult Large)   Pulse (!) 43   Temp 98.6  F (37  C) (Tympanic)   Resp 16   Ht 1.791 m (5' 10.5\")   Wt 84.4 kg (186 lb)   SpO2 97%   BMI 26.31 kg/m    Estimated body mass index is 26.31 kg/m  as calculated from the following:    Height as of this encounter: 1.791 m (5' 10.5\").    Weight as of this encounter: 84.4 kg (186 lb).  Medication Reconciliation: complete    Essence Briggs LPN      SUBJECTIVE:   Adan Daugherty is a 34 year old male who presents to clinic today for the following health issues:    Patient [facility] for left ear pain.  He reports that there is been 3 days.  Denies fevers, chills, nasal congestion, cough, shortness of breath or wheezing.  He denies ear discharge or radiating pain.  He is also worried that he may have perforated his eardrum.  He has a history of ear surgery including repair of the tympanic membrane.  He also has a history of otitis externa frequently.  He was swimming and his head did go underwater however he was wearing earplugs.    Problem list and histories reviewed & adjusted, as indicated.  Additional history: as documented    Patient Active Problem List   Diagnosis     Acid reflux     Anxiety state     Insomnia     Otitis media, chronic     Posttraumatic stress disorder     Nonallopathic lesion of pelvic region     Nonallopathic lesion of lumbar region     Seborrhea capitis     Nonallopathic lesion of thoracic region     Yeast dermatitis     Past Surgical History:   Procedure Laterality Date     OTHER SURGICAL HISTORY      [id number],MASTOIDECTOMY,and reconstruction, multiple ear surgeries       Social History     Tobacco Use     Smoking status: " "Former Smoker     Packs/day: 1.00     Years: 8.00     Pack years: 8.00     Types: Cigarettes     Last attempt to quit: 2010     Years since quittin.0     Smokeless tobacco: Current User     Types: Chew     Tobacco comment: Quit smoking: Quit 3 years ago   Substance Use Topics     Alcohol use: Yes     Comment: occasional     Family History   Problem Relation Age of Onset     Other - See Comments Father         Psychiatric illness,bipolar         Current Outpatient Medications   Medication Sig Dispense Refill     ofloxacin (FLOXIN) 0.3 % otic solution Place 5 drops Into the left ear 2 times daily for 7 days 4 mL 0     Allergies   Allergen Reactions     Amoxicillin Unknown     Penicillins Unknown         ROS:  Notable findings in the HPI.       OBJECTIVE:     BP (!) 138/90 (BP Location: Left arm, Patient Position: Sitting, Cuff Size: Adult Large)   Pulse (!) 43   Temp 98.6  F (37  C) (Tympanic)   Resp 16   Ht 1.791 m (5' 10.5\")   Wt 84.4 kg (186 lb)   SpO2 97%   BMI 26.31 kg/m    Body mass index is 26.31 kg/m .  GENERAL: healthy, alert and no distress  EYES: Eyes grossly normal to inspection  HENT: normal cephalic/atraumatic, right ear: normal: no effusions, no erythema, normal landmarks, left ear: No effusions, no erythema normal bony landmarks however does have scarring of the tympanic membrane, no rupture is noted.  And red and boggy canal, nose and mouth without ulcers or lesions, oropharynx clear and oral mucous membranes moist  RESP: Without increased work of breathing  CV: regular rates and rhythm and no peripheral edema    Diagnostic Test Results:  none     ASSESSMENT/PLAN:     1. Infective otitis externa, left  - ofloxacin (FLOXIN) 0.3 % otic solution; Place 5 drops Into the left ear 2 times daily for 7 days  Dispense: 4 mL; Refill: 0    PLAN:    Patient will use eardrops as discussed.  Follow-up if worsens.  At times he will use an oral antibiotic and the eardrops.  At this time it does not " seem like the oral antibiotic is indicated however should he call and symptoms worsen an oral antibiotic should be considered.    Followup:    If not improving or if condition worsens, follow up with your Primary Care Provider    I explained my diagnostic considerations and recommendations to the patient, who voiced understanding and agreement with the treatment plan. All questions were answered. We discussed potential side effects of any prescribed or recommended therapies, as well as expectations for response to treatments.  He was advised to contact our office if there is no improvement or worsening of conditions or symptoms.  If s/s worsen or persist, patient will either come back or follow up with PCP.    Disclaimer:  This note consists of words and symbols derived from keyboarding, dictation, or using voice recognition software. As a result, there may be errors in the script that have gone undetected. Please consider this when interpreting information found in this note.      Kacie Ag NP, 7/30/2019 6:10 PM

## 2019-07-30 NOTE — PATIENT INSTRUCTIONS
Patient Education     External Ear Infection (Adult)  External otitis (also called  swimmer s ear ) is an infection in the ear canal. It is often caused by bacteria or fungus. It can occur a few days after water gets trapped in the ear canal (from swimming or bathing). It can also occur after cleaning too deeply in the ear canal with a cotton swab or other object. Sometimes, hair care products get into the ear canal and cause this problem.  Symptoms can include pain, fever, itching, redness, drainage, or swelling of the ear canal. Temporary hearing loss may also occur.  Home care    Do not try to clean the ear canal. This can push pus and bacteria deeper into the canal.    Use prescribed ear drops as directed. These help reduce swelling and fight the infection. If an ear wick was placed in the ear canal, apply drops right onto the end of the wick. The wick will draw the medicine into the ear canal even if it is swollen closed.    A cotton ball may be loosely placed in the outer ear to absorb any drainage.    You may use acetaminophen or ibuprofen to control pain, unless another medicine was prescribed. Note: If you have chronic liver or kidney disease or ever had a stomach ulcer or GI bleeding, talk to your healthcare provider before taking any of these medicines.    Do not allow water to get into your ear when bathing. Also, don't swim until the infection has cleared.  Prevention    Keep your ears dry. This helps lower the risk of infection. Dry your ears with a towel or hair dryer after getting wet. Also, use ear plugs when swimming.    Do not stick any objects in the ear to remove wax.    If you feel water trapped in your ear, use ear drops right away. You can get these drops over the counter at most drugstores. They work by removing water from the ear canal.  Follow-up care  Follow up with your healthcare provider in 1 week, or as advised.  When to seek medical advice  Call your healthcare provider right away if  any of these occur:    Ear pain becomes worse or doesn t improve after 3 days of treatment    Redness or swelling of the outer ear occurs or gets worse    Headache    Painful or stiff neck    Drowsiness or confusion    Fever of 100.4 F (38 C) or higher, or as directed by your healthcare provider    Seizure  Date Last Reviewed: 10/1/2017    2309-3999 [employer]. [address] [place] [address]. All rights reserved. This information is not intended as a substitute for professional medical care. Always follow your healthcare professional's instructions.

## 2019-07-30 NOTE — NURSING NOTE
"Patient presents to clinic for left sided earache since Saturday. Has not used any medication for it .   Chief Complaint   Patient presents with     Ear Problem       Initial BP (!) 138/90 (BP Location: Left arm, Patient Position: Sitting, Cuff Size: Adult Large)   Pulse (!) 43   Temp 98.6  F (37  C) (Tympanic)   Resp 16   Ht 1.791 m (5' 10.5\")   Wt 84.4 kg (186 lb)   SpO2 97%   BMI 26.31 kg/m   Estimated body mass index is 26.31 kg/m  as calculated from the following:    Height as of this encounter: 1.791 m (5' 10.5\").    Weight as of this encounter: 84.4 kg (186 lb).  Medication Reconciliation: complete    Essence Briggs LPN    "

## 2019-10-18 ENCOUNTER — TRANSFERRED RECORDS (OUTPATIENT)
Dept: HEALTH INFORMATION MANAGEMENT | Facility: OTHER | Age: 34
End: 2019-10-18

## 2020-10-07 ENCOUNTER — HOSPITAL ENCOUNTER (OUTPATIENT)
Dept: GENERAL RADIOLOGY | Facility: OTHER | Age: 35
Discharge: HOME | End: 2020-10-07
Attending: FAMILY MEDICINE | Admitting: FAMILY MEDICINE
Payer: COMMERCIAL

## 2020-10-07 DIAGNOSIS — R13.10 DYSPHAGIA, UNSPECIFIED TYPE: ICD-10-CM

## 2020-10-07 PROCEDURE — 74220 X-RAY XM ESOPHAGUS 1CNTRST: CPT

## 2020-10-07 PROCEDURE — 255N000001 HC RX 255: Performed by: RADIOLOGY

## 2020-10-07 RX ADMIN — ANTACID/ANTIFLATULENT 4 G: 380; 550; 10; 10 GRANULE, EFFERVESCENT ORAL at 09:53

## 2020-12-31 ENCOUNTER — TRANSFERRED RECORDS (OUTPATIENT)
Dept: HEALTH INFORMATION MANAGEMENT | Facility: CLINIC | Age: 35
End: 2020-12-31

## 2021-01-08 ENCOUNTER — TRANSCRIBE ORDERS (OUTPATIENT)
Dept: OTHER | Age: 36
End: 2021-01-08

## 2021-01-08 ENCOUNTER — PATIENT OUTREACH (OUTPATIENT)
Dept: GASTROENTEROLOGY | Facility: CLINIC | Age: 36
End: 2021-01-08

## 2021-01-08 DIAGNOSIS — R13.10 DYSPHAGIA, UNSPECIFIED TYPE: ICD-10-CM

## 2021-01-08 DIAGNOSIS — K22.0 ACHALASIA: Primary | ICD-10-CM

## 2021-01-08 DIAGNOSIS — K22.0 ACHALASIA: ICD-10-CM

## 2021-01-08 DIAGNOSIS — K22.4 ESOPHAGEAL DYSMOTILITY: Primary | ICD-10-CM

## 2021-01-08 NOTE — PROGRESS NOTES
Care Coordination New Patient Referral  [facility]    NP referral date: 1/8/21  Referred to MD: Raymond    Referring MD: Joey Foster - [facility]  Referring contact information see initial referral note   Referral for evaluation for POEM    Diagnosis: achalasia  1-3 beers/day  BMI 26.3  See care everywhere PCP note from 10/9/20 for history  GI consult with [facility] 10/29/20    Imaging:  Requested images on 1/8/21    Esophageal motility study - 12/31/20 - report in care everywhere  Bravo study - 11/19/20  IMPRESSION: Normal 48-hour pH monitoring study without evidence of reflux.   Esophogram 10/8/20    Procedures:  EGD 10/5/20    Referral reviewed by Dr Andrade   Plan for virtual clinic visit with Dr. Andrade.    Contacted [facility] GI department to request Manometry images on 1/11/21 - they will call me back.    1/11/21 called patient to review information.  I will attempt to obtain Manometry imaging from [facility] in [place].    1/12/21 returned call to [facility] to inform them that the message that they left yesterday that we need to contact medical records will not work and that Manometry must be sent on a disc for review and needs to be made in endoscopy.  They will look into this and call me.    Referral sent to New Patient scheduling on 1/11/21 at 2 pm via in basket staff message and the patient will be contacted with appointment.     Sonja Cook  BSN, HNBC  RN Care Coordinator  [facility]  Ph: [phone]  Email: [email or web address]

## 2021-01-11 ENCOUNTER — TRANSCRIBE ORDERS (OUTPATIENT)
Dept: OTHER | Age: 36
End: 2021-01-11

## 2021-01-11 DIAGNOSIS — K22.0 ACHALASIA: Primary | ICD-10-CM

## 2021-01-11 NOTE — TELEPHONE ENCOUNTER
Oncology/Surgical Oncology Referral Request:     Specialty Requested: Medical Oncology     Referring Provider: Dr Joey Foster    Referring Clinic/Organization: [facility] location: Faxed - Media tab/Scanned     Requested Provider (if specified): Not Specified

## 2021-01-12 NOTE — TELEPHONE ENCOUNTER
RECORDS STATUS - ALL OTHER DIAGNOSIS      RECORDS RECEIVED FROM: [facility], [facility]   DATE RECEIVED: 1/12/21   NOTES STATUS DETAILS   OFFICE NOTE from referring provider EDOUARD - Joey Gordon MD,   OFFICE NOTE from medical oncologist     DISCHARGE SUMMARY from hospital     DISCHARGE REPORT from the ER     Motility Study CE - Makenzie 12/31/20: Motility Study   OPERATIVE REPORT CE - Makenzie 11/19/20, 10/5/20: EGD   MEDICATION LIST     CLINICAL TRIAL TREATMENTS TO DATE     LABS     PATHOLOGY REPORTS     ANYTHING RELATED TO DIAGNOSIS     GENONOMIC TESTING     TYPE:     IMAGING (NEED IMAGES & REPORT)     XR Esophagram PACS 10/7/20: Epic   CT SCANS     MRI     MAMMO     ULTRASOUND     PET

## 2021-01-26 RX ORDER — IBUPROFEN 200 MG
200 TABLET ORAL EVERY 4 HOURS PRN
COMMUNITY

## 2021-01-26 RX ORDER — LISINOPRIL 10 MG/1
10 TABLET ORAL
COMMUNITY
Start: 2021-01-07 | End: 2022-02-26

## 2021-01-26 RX ORDER — ACETAMINOPHEN 325 MG/1
650 TABLET ORAL EVERY 4 HOURS PRN
COMMUNITY

## 2021-01-26 NOTE — PROGRESS NOTES
Adan is a 35 year old who is being evaluated via a billable video visit.      How would you like to obtain your AVS? MyChart  If the video visit is dropped, the invitation should be resent by: Text to cell phone: [phone]  Will anyone else be joining your video visit? No      Video Start Time: 8:10 AM  Video-Visit Details    Type of service:  Video Visit    Video End Time:8:57 AM    Originating Location (pt. Location): Home    Distant Location (provider location):  [facility]     Platform used for Video Visit: [facility]    INTERVENTIONAL ENDOSCOPY OUTPATIENT CLINIC CONSULT  DATE OF SERVICE: 1/27/2021  PHYSICIAN REQUESTING CONSULT: Joey Foster MD, MD  Reason for Consultation: dysphagia, possible achalasia    ASSESSMENT:  Adan is a 35 year old male who was referred due to dysphagia and possible achalasia. I reviewed Adan's outside endoscopy report, esophagram, Bravo/manometry reports. He most likely has type 2 achalasia but there are some findings that are inconsistent. We have been trying to obtain the raw data from his manometry in [place] to review but have thus far been unsuccessful. Per Dr. Foster's report, he was found to have peristaltic motility in the esophagus which you don't typically see in achalasia. The esophagram is also not typical and unfortunately was not a timed study. Alternatively, patient could have EGJOO or IEM which would  significantly. Patient was hesitant to repeat his esophageal manometry here at [facility] as the first one was fairly traumatic potentially related to a prior history of a broken nose. But he was willing to proceed. We will also be able to get multiple rapid swallow testing which might help uncover his underlying motility disorder better.    With all that said, my suspicion is fairly high that he does have underlying achalasia so we did  discussed this diagnosis and the pathophysiology although the etiology is unclear. We  reviewed all the treatment options of achalasia including Heller myotomy with fundoplication and POEM. I explained that these interventions do not reverse the condition but allow the esophagus to empty relieving symptoms.     I discussed the POEM procedure in detail including the risks which including delayed bleeding, esophageal leak/perforation, pneumoperitoneum, infection, and worsening reflux. I explained that she would be admitted after the procedure for observation with an esophagram the following morning to rule out a leak. I also explained that BID PPI for 1 month following the procedure will be needed. I did explain that with the POEM procedure it appears that about 20% of patient will require long term PPI therapy afterwards for reflux disease in the longer term studies.     If he is unable to complete a second attempt at manometry, I did suggest proceeding with an EGD and possible POEM (depending on the endoscopic appearance) with the understanding that there is a small possibility of a misdiagnosis. We will reconvene after the repeat manometry to discuss.    RECOMMENDATIONS:  - Repeat esophageal manometry at [facility] and then RTC      Efrain Andrade MD  [facility]  Division of Gastroenterology and Hepatology  [facility] [address] [address]    ________________________________________________________________  HPI:  Adan is a 35 year old male who was referred due to dysphagia and possible achalasia. He reports a 10-11 year history of symptoms that were initially progressive but plateau'd about 7-8 years ago with dysphagia (feeling food sit in his chest). Nearly all foods can cause this symptom including milk. Water and juice are less likely to. He will also have chest discomfort after eating behind his sternum. No regurgitation history. Initially, he was told it heartburn related and he feels his symptoms were blown off so he essentially lived with his  symptoms for a long time. He had evaluation by Dr. Foster in [place] with findings concerning for achalasia so he was referred to me for further management.    Eckardt score:  Dysphagia   3 [none, occ, daily, each meal]  Regurgitation   0 [none, occ, daily, each meal]  Retrosternal pain  3 [none, occ, daily, each meal]  Weight loss   0 [0, <5 kg, 5-10, >10 kg]  Total    6      PMHx:  Past Medical History:   Diagnosis Date     Pain in joint, lower leg     hx of     Personal history of other specified diseases     No Comments Provided       PSurgHx:  Past Surgical History:   Procedure Laterality Date     OTHER SURGICAL HISTORY      [id number],MASTOIDECTOMY,and reconstruction, multiple ear surgeries       MEDS:  Current Outpatient Medications   Medication     acetaminophen (TYLENOL) 325 MG tablet     ibuprofen (ADVIL/MOTRIN) 200 MG tablet     lisinopril (ZESTRIL) 10 MG tablet     No current facility-administered medications for this visit.      ALLERGIES:    Allergies   Allergen Reactions     Amoxicillin Unknown     Penicillins Unknown     FHx:  Family History   Problem Relation Age of Onset     Other - See Comments Father         Psychiatric illness,bipolar       SOCIAL Hx:  Social History     Socioeconomic History     Marital status:      Spouse name: Not on file     Number of children: Not on file     Years of education: Not on file     Highest education level: Not on file   Occupational History     Not on file   Social Needs     Financial resource strain: Not on file     Food insecurity     Worry: Not on file     Inability: Not on file     Transportation needs     Medical: Not on file     Non-medical: Not on file   Tobacco Use     Smoking status: Former Smoker     Packs/day: 1.00     Years: 8.00     Pack years: 8.00     Types: Cigarettes     Quit date: 7/5/2010     Years since quitting: 10.5     Smokeless tobacco: Current User     Types: Chew     Tobacco comment: Quit smoking: Quit 3 years ago   Substance and  "Sexual Activity     Alcohol use: Yes     Comment: occasional     Drug use: No     Sexual activity: Not Currently   Lifestyle     Physical activity     Days per week: Not on file     Minutes per session: Not on file     Stress: Not on file   Relationships     Social connections     Talks on phone: Not on file     Gets together: Not on file     Attends Islam service: Not on file     Active member of club or organization: Not on file     Attends meetings of clubs or organizations: Not on file     Relationship status: Not on file     Intimate partner violence     Fear of current or ex partner: Not on file     Emotionally abused: Not on file     Physically abused: Not on file     Forced sexual activity: Not on file   Other Topics Concern     Parent/sibling w/ CABG, MI or angioplasty before 65F 55M? Not Asked   Social History Narrative    Patient is a former smoker- smoked 10 yrs. Quit 1 yr ago.     Regular Exercise - no.  \"physicallly active\"-- to [employer].  One child with her and one stepchild.  The stepchild biologic father has gotten custody.  His wife was sev    erely ill with blastomycosis spring of 2011.  She is recovering.  She had CNS involvement.       ROS: A comprehensive Review of Systems was asked and answered in the negative unless specifically commented upon in the HPI    Physical Exam  Gen: A&Ox3, NAD  HEENT: Moist mucus membranes, no scleral icterus.  Lungs: no respiratory distress      IMAGING:  PROCEDURE: XR ESOPHAGRAM     HISTORY:  Dysphagia, unspecified type     TECHNIQUE:  A biphasic esophagram was performed. Effervescent crystals  and thick followed by thin barium were administered orally. Cine  images of the cervical esophagus were included.      COMPARISON:  None.     FINDINGS:       Cervical Esophagus:  Cine imaging of the cervical esophagus was  performed in the AP and lateral projections. There was no obstruction  to the flow of barium through the cervical " esophagus. There is no  extrinsic impression on or displacement of the cervical esophagus. No  diverticulum or stricture is seen.     Thoracic Esophagus:  Moderate to severe generalized thoracic  esophageal dysmotility is seen throughout the exam, with to and fro  movement, tertiary contractions and retention of oral contrast,  particularly when drinking prone. No stricture or mucosal abnormality  is identified. No hiatal hernia is identified.      Assessment for reflux is limited by retained esophageal contrast.                                                                      IMPRESSION:       Moderate to advanced esophageal dysmotility.  Differential  considerations include the sequela of chronic reflux esophagitis and  connective tissue disorders/early achalasia. No beaking of the distal  esophagus at this time. Consider EGD with tissue sampling as well as  reflux workup.    Endoscopies:  10/05/2020  1:32 PM CDT  Formatting of this note might be different from the original.    Patient Name: KAREN VO  Date of Service: 10/05/2020  : 1985  Age: 35  Sex: M    MRN: [id number]        Patient Loc/Room: /  Provider: Brandt Ace MD, General Surgery    GI PROCEDURE     SITE:  [facility]  ORDERED BY:        PREOPERATIVE DIAGNOSIS: Dysphagia.    POSTOPERATIVE DIAGNOSIS: Same.    PROCEDURE: Upper gastrointestinal endoscopy.    ANESTHESIA: Monitored anesthesia care.    INDICATIONS: The patient has developed increasing problems with swallowing over the past few months and has had several episodes where food gets stuck. He says it is usually easy to initiate swallowing but food can go part way down and just sit there. He can wash his food down to assist passage. Liquids do not appear to be a problem. He has a history of some reflux symptoms, but they are not bad and he requires no medication for it. Family history is noncontributory. Risks and benefits of the procedure are  discussed with the patient who understands and accepts. Procedure is elective.    DESCRIPTION OF PROCEDURE: The patient is brought to the endoscopy suite and placed in the left lateral recumbent position. The bite block is positioned and secured. Satisfactory monitored anesthesia care was provided by the anesthetist. This is utilized because of his symptoms of GERD. After a time-out, the Olympus gastroscope was introduced into the oropharynx and directed down the hypopharynx. The cords appeared to be clean and move normally, swallowing proceeds normally. The gastroscope was easily introduced into the esophagus and passed distally. The esophageal mucosa is clean and free from ulceration examination for a Zenker diverticulum revealed no evidence of this being present. The squamocolumnar junction is free from erosions or ulceration. Bile reflux is present in the gastric pouch. There is minimal inflammation of the gastric mucosa and no ulceration. The pylorus appears to be functional. The duodenal bulb and 1st and 2nd portions of the duodenum are normal to exam. Reflex view of the GE junction from below is unremarkable. The scope is brought back through the squamocolumnar junction. Examination here revealed no evidence of a stricture or  inflammatory changes. There are some minimal changes of the mucosa compatible with chronic reflux, but these are not severe. Varices are not seen. Motility appears to be somewhat spastic. No obstructive phenomenon is identified. The scope was withdrawn and the patient awakened and transferred to the recovery room in satisfactory condition, having tolerated the procedure well. There were no apparent complications of the anesthetic or procedure. No tissues were removed. No electrocautery was employed.    FINDINGS: There is no ulceration and no Schatzki ring, or other signs of blockage is present. A hiatus hernia is not seen. Motility of the esophagus itself is suspect.    RECOMMENDATIONS:  A Barium swallow may be of benefit in identifying the cause of his dysphagia as no source of obstruction was identified.    Joey Foster MD - 2020  9:17 AM CST  Formatting of this note might be different from the original.    Patient Name: KAREN VO  Date of Service: 2020  : 1985  Age: 35  Sex: M    MRN: [id number]        Patient Loc/Room: /  Provider: Joey Foster MD, Gastroenterology    GI PROCEDURE     SITE:  [facility]  ORDERED BY:        PROCEDURE: Interpretation of high resolution esophageal motility study.    INDICATION: History of atypical chest pain and chronic dysphagia with normal upper endoscopy. Bravo pH monitoring study performed off therapy was normal without signs of significant reflux. He had an esophagram locally that showed some tertiary contractions, as well as retained barium.    The high resolution esophageal motility catheter was advanced via the nares to the esophagus and the study was performed with liquid swallows.    The lower esophageal sphincter is identified between 48 and 52 cm. Resting LES pressure is above normal at 54 mmHg. Integrated residual pressures post-swallow at the LES also were significantly elevated at 46.2 mmHg. Percent relaxation of the LES is abnormal at 9. Motility was then assessed throughout the esophageal body. Distal contractile integral was normal at 1238.3. Only 20% of swallows were peristaltic while 80% were simultaneous. There were no failed swallows.    IMPRESSION: Abnormal esophageal motility study. Given the significant increase in integrated residual pressures at the LES post-swallow, I am concerned that this represents achalasia. He does not have any failed swallows to suggest type 1 and distal contractile integral is negative, arguing against any sort of spasm. I am suspicious this may represent a type 2 achalasia. Reviewing the local esophagram, he does have some dilatation of the esophagus, in  addition to barium retention, which also could fit for achalasia.    RECOMMENDATIONS: Given his age and good candidacy for surgical myotomy, I would recommend a referral to the [facility] for POEM.     2020 11:42 AM CST  Formatting of this note might be different from the original.    Patient Name: KAREN VO  Date of Service: 2020  : 1985  Age: 35  Sex: M    MRN: [id number]        Patient Loc/Room: /  Provider: Joey Foster MD, Gastroenterology    GI PROCEDURE     SITE:    ORDERED BY:        PROCEDURE: Interpretation of 48-hour Bravo pH monitoring study.    INDICATION: History of atypical chest pain, rule out reflux. The patient underwent upper endoscopy on 2020 with findings of a normal esophagus. Random biopsies were normal without evidence of eosinophilic esophagitis. The Bravo monitor was placed at that time for pH monitoring off therapy with proton pump inhibitors.    The 48-hour study is reviewed. Over that time only 1 reflux event occurred. Acid exposure time was normal at 0.2 and calculated DeMeester score was well within normal limits at 1.8. The event that did occur was on day 2. The patient's symptom diary was reviewed. It appears that he defaulted twice for cough, but not related to chest pain. Unfortunately, he did not default on the recording device with symptoms and, therefore, a symptom association probability was not calculated.    IMPRESSION: Normal 48-hour pH monitoring study without evidence of reflux. I would continue him off proton pump inhibitor therapy.

## 2021-01-27 ENCOUNTER — VIRTUAL VISIT (OUTPATIENT)
Dept: GASTROENTEROLOGY | Facility: CLINIC | Age: 36
End: 2021-01-27
Attending: INTERNAL MEDICINE
Payer: COMMERCIAL

## 2021-01-27 ENCOUNTER — PRE VISIT (OUTPATIENT)
Dept: GASTROENTEROLOGY | Facility: CLINIC | Age: 36
End: 2021-01-27

## 2021-01-27 DIAGNOSIS — K22.0 ACHALASIA: ICD-10-CM

## 2021-01-27 DIAGNOSIS — R13.19 ESOPHAGEAL DYSPHAGIA: Primary | ICD-10-CM

## 2021-01-27 PROCEDURE — 999N001193 HC VIDEO/TELEPHONE VISIT; NO CHARGE

## 2021-01-27 PROCEDURE — 99204 OFFICE O/P NEW MOD 45 MIN: CPT | Mod: 95 | Performed by: INTERNAL MEDICINE

## 2021-01-28 DIAGNOSIS — Z11.59 ENCOUNTER FOR SCREENING FOR OTHER VIRAL DISEASES: Primary | ICD-10-CM

## 2021-02-23 ENCOUNTER — PATIENT OUTREACH (OUTPATIENT)
Dept: GASTROENTEROLOGY | Facility: CLINIC | Age: 36
End: 2021-02-23

## 2021-02-23 NOTE — PROGRESS NOTES
[phone]  [facility] in [place], [place]      Fax order to: [phone]

## 2021-02-26 ENCOUNTER — NURSE TRIAGE (OUTPATIENT)
Dept: FAMILY MEDICINE | Facility: OTHER | Age: 36
End: 2021-02-26

## 2021-02-27 ENCOUNTER — OFFICE VISIT (OUTPATIENT)
Dept: FAMILY MEDICINE | Facility: OTHER | Age: 36
End: 2021-02-27
Attending: INTERNAL MEDICINE
Payer: COMMERCIAL

## 2021-02-27 DIAGNOSIS — Z11.59 ENCOUNTER FOR SCREENING FOR OTHER VIRAL DISEASES: ICD-10-CM

## 2021-02-27 LAB
SARS-COV-2 RNA RESP QL NAA+PROBE: NORMAL
SPECIMEN SOURCE: NORMAL

## 2021-02-27 PROCEDURE — U0003 INFECTIOUS AGENT DETECTION BY NUCLEIC ACID (DNA OR RNA); SEVERE ACUTE RESPIRATORY SYNDROME CORONAVIRUS 2 (SARS-COV-2) (CORONAVIRUS DISEASE [COVID-19]), AMPLIFIED PROBE TECHNIQUE, MAKING USE OF HIGH THROUGHPUT TECHNOLOGIES AS DESCRIBED BY CMS-2020-01-R: HCPCS | Performed by: INTERNAL MEDICINE

## 2021-02-27 PROCEDURE — U0005 INFEC AGEN DETEC AMPLI PROBE: HCPCS | Performed by: INTERNAL MEDICINE

## 2021-02-28 LAB
LABORATORY COMMENT REPORT: NORMAL
SARS-COV-2 RNA RESP QL NAA+PROBE: NEGATIVE
SPECIMEN SOURCE: NORMAL

## 2021-03-03 ENCOUNTER — OFFICE VISIT (OUTPATIENT)
Dept: GASTROENTEROLOGY | Facility: CLINIC | Age: 36
End: 2021-03-03
Attending: INTERNAL MEDICINE
Payer: COMMERCIAL

## 2021-03-03 VITALS
WEIGHT: 190 LBS | OXYGEN SATURATION: 97 % | HEART RATE: 45 BPM | DIASTOLIC BLOOD PRESSURE: 97 MMHG | RESPIRATION RATE: 16 BRPM | HEIGHT: 71 IN | TEMPERATURE: 98.2 F | BODY MASS INDEX: 26.6 KG/M2 | SYSTOLIC BLOOD PRESSURE: 163 MMHG

## 2021-03-03 DIAGNOSIS — K22.0 ACHALASIA: ICD-10-CM

## 2021-03-03 PROCEDURE — 91010 ESOPHAGUS MOTILITY STUDY: CPT

## 2021-03-03 PROCEDURE — 91037 ESOPH IMPED FUNCTION TEST: CPT

## 2021-03-03 ASSESSMENT — MIFFLIN-ST. JEOR: SCORE: 1823.96

## 2021-03-03 ASSESSMENT — PAIN SCALES - GENERAL: PAINLEVEL: NO PAIN (0)

## 2021-03-03 NOTE — LETTER
3/3/2021         RE: Adan Daugherty  [address]  [place] [address]        Dear Colleague,    Thank you for referring your patient, Adan Daugherty, to the [facility] GASTRO PROCEDURE [place]. Please see a copy of my visit note below.    Non-Invasive GI Procedure Visit    Adan Daugherty is a 35 year old male with history of Achalasia.   Patient stated reason for procedure: Dysphagia and Pre-surgical Evaluation  COVID-19 Test Performed within 48-72hrs of the procedure:  Yes. COVID-19 result was NEGATIVE.    COVID-19 PCR Results    COVID-19 PCR Results 10/1/20 12/26/20 2/27/21 2/27/21      [id number]   COVID-19 Virus PCR to [facility] - Result   Test received-See reflex to IDDL test SARS CoV2 (COVID-19) Virus RT-PCR    COVID-19 Virus PCR to [facility] - Source   Nasopharyngeal    COVID-19 Virus by PCR (External Result) Undetected Undetected     SARS-CoV-2 Virus Specimen Source    Nasopharyngeal   SARS-CoV-2 PCR Result    NEGATIVE      Comments are available for some flowsheets but are not being displayed.         COVID-19 Antibody Results, Testing for Immunity    COVID-19 Antibody Results, Testing for Immunity   No data to display.             Pre-Procedure Assessment  Patient presents to clinic today for Esophageal Manometry Study    Referring Provider: Dr Andrade  Patient has undergone previous endoscopy.    Does patient report taking a PPI (omeprazole, pantoprazole, rabeprazole, lansoprazole, esomeprazole, dexlansoprazole)? No  Does patient report taking a H2 blocker (ranitidine, or famotidine)? No  Does patient report taking opioids? No  Patient reported that last food and/or drink was last consumed 10 hours ago.  Esophageal Questionnaire(s) Completed:   Yes - Esophageal Questionnaire(s)    BEDQ Questionnaire  BEDQ Questionnaire: How Often Have You Had the Following? 3/3/2021   Trouble eating solid food (meat, bread, vegetables) 5   Trouble eating soft foods (yogurt, jello, pudding) 5    Trouble swallowing liquids 3   Pain while swallowing 3   Coughing or choking while swallowing foods or liquids 2   Total Score: 18     BEDQ Questionnaire: Discomfort/Pain Ratings 3/3/2021   Eating solid food (meat, bread, vegetables) 5   Eating soft foods (yogurt, jello, pudding) 5   Drinking liquid 0   Total Score: 10       Eckardt Questionnaire  Eckardt Questionnaire 3/3/2021   Dysphagia 3   Regurgitation 0   Retrosternal Pain 0   Weight Loss (kg) 0   Total Score:  3       Promis 10 Questionnaire  PROMIS 10 FLOWSHEET DATA 3/3/2021   In general, would you say your health is: 4   In general, would you say your quality of life is: 4   In general, how would you rate your physical health? 4   In general, how would you rate your mental health, including your mood and your ability to think? 3   In general, how would you rate your satisfaction with your social activities and relationships? 3   In general, please rate how well you carry out your usual social activities and roles. (This includes activities at home, at work and in your community, and responsibilities as a parent, child, spouse, employee, friend, etc.) 3   To what extent are you able to carry out your everyday physical activities such as walking, climbing stairs, carrying groceries, or moving a chair? 4   In the past 7 days, how often have you been bothered by emotional problems such as feeling anxious, depressed, or irritable? 1   In the past 7 days, how would you rate your fatigue on average? 2   In the past 7 days, how would you rate your pain on average, where 0 means no pain, and 10 means worst imaginable pain? 2   Mental health question re-calculation - no clinical value 5   Physical health question re-calculation - no clinical value 4   Pain question re-calculation - no clinical value 4   Global Mental Health Score 15   Global Physical Health Score 16   PROMIS TOTAL - SUBSCORES 31       .    Patient Hx  Patient's history, medications and allergies  "were reviewed.     Height: 5' 11\"   Weight: 190 lbs 0 oz    Patient Active Problem List    Diagnosis Date Noted     Yeast dermatitis 09/23/2018     Priority: Medium     Insomnia 03/09/2018     Priority: Medium     Acid reflux 08/20/2013     Priority: Medium     Anxiety state 07/07/2011     Priority: Medium     Otitis media, chronic 07/07/2011     Priority: Medium     Posttraumatic stress disorder 07/07/2011     Priority: Medium     Seborrhea capitis 07/07/2011     Priority: Medium     Nonallopathic lesion of pelvic region 10/25/2010     Priority: Medium     Nonallopathic lesion of lumbar region 10/25/2010     Priority: Medium     Nonallopathic lesion of thoracic region 10/25/2010     Priority: Medium      Prior to Admission medications    Medication Sig Start Date End Date Taking? Authorizing Provider   acetaminophen (TYLENOL) 325 MG tablet Take 650 mg by mouth    Reported, Patient   ibuprofen (ADVIL/MOTRIN) 200 MG tablet Take 200 mg by mouth    Reported, Patient   lisinopril (ZESTRIL) 10 MG tablet Take 10 mg by mouth 1/7/21   Reported, Patient     Allergies   Allergen Reactions     Amoxicillin Unknown     Penicillins Unknown     Past Medical History:   Diagnosis Date     Pain in joint, lower leg     hx of     Personal history of other specified diseases     No Comments Provided     Past Surgical History:   Procedure Laterality Date     OTHER SURGICAL HISTORY      [id number],MASTOIDECTOMY,and reconstruction, multiple ear surgeries     Family History   Problem Relation Age of Onset     Other - See Comments Father         Psychiatric illness,bipolar     Social History     Tobacco Use     Smoking status: Former Smoker     Packs/day: 1.00     Years: 8.00     Pack years: 8.00     Types: Cigarettes     Quit date: 7/5/2010     Years since quitting: 10.6     Smokeless tobacco: Current User     Types: Chew     Tobacco comment: Quit smoking: Quit 3 years ago   Substance Use Topics     Alcohol use: Yes     Comment: occasional    "     Pre-Procedure Education & Consent  Procedure education was provided to: Patient  Teaching method: Explanation  Barriers to learning: No Barrier    Patient indicated understanding of pre-procedure instruction and appropriate consent was obtained and documented.    ____________________________________________________________________    Post-Procedure Documentation: Esophageal Manometry    Manometry catheter was placed via right nare to 55 cm and normal saline swallows given per protocol. Manometry catheter was removed at the end of test.    Discharge instructions given to patient.    Notification of pending test results sent to provider for interpretation. Please reference scanned document for final interpretation of results. Patient will follow up with referring provider for test results.    Sachi Lindsey RN on 3/3/2021 at 11:17 AM          Again, thank you for allowing me to participate in the care of your patient.        Sincerely,        Non-Invasive GI Nurse

## 2021-03-03 NOTE — PATIENT INSTRUCTIONS
Esophogeal Manometry Study  1. Resume regular diet.  2. You may have a bloody nose or sore throat after the procedure.  3. If you have questions call [phone] from 7:00am-5:00pm.  For afterhours questions call GI doctor on call at [phone].

## 2021-03-03 NOTE — Clinical Note
The final procedure report is complete and has been sent to HIM to be scanned and attached to the procedure order. You can expect the report to be available for viewing from the Epic procedures tab within 2-3 business days.

## 2021-03-03 NOTE — PROGRESS NOTES
Non-Invasive GI Procedure Visit    Adan Daugherty is a 35 year old male with history of Achalasia.   Patient stated reason for procedure: Dysphagia and Pre-surgical Evaluation  COVID-19 Test Performed within 48-72hrs of the procedure:  Yes. COVID-19 result was NEGATIVE.    COVID-19 PCR Results    COVID-19 PCR Results 10/1/20 12/26/20 2/27/21 2/27/21      [id number]   COVID-19 Virus PCR to [facility] - Result   Test received-See reflex to IDDL test SARS CoV2 (COVID-19) Virus RT-PCR    COVID-19 Virus PCR to [facility] - Source   Nasopharyngeal    COVID-19 Virus by PCR (External Result) Undetected Undetected     SARS-CoV-2 Virus Specimen Source    Nasopharyngeal   SARS-CoV-2 PCR Result    NEGATIVE      Comments are available for some flowsheets but are not being displayed.         COVID-19 Antibody Results, Testing for Immunity    COVID-19 Antibody Results, Testing for Immunity   No data to display.             Pre-Procedure Assessment  Patient presents to clinic today for Esophageal Manometry Study    Referring Provider: Dr Andrade  Patient has undergone previous endoscopy.    Does patient report taking a PPI (omeprazole, pantoprazole, rabeprazole, lansoprazole, esomeprazole, dexlansoprazole)? No  Does patient report taking a H2 blocker (ranitidine, or famotidine)? No  Does patient report taking opioids? No  Patient reported that last food and/or drink was last consumed 10 hours ago.  Esophageal Questionnaire(s) Completed:   Yes - Esophageal Questionnaire(s)    BEDQ Questionnaire  BEDQ Questionnaire: How Often Have You Had the Following? 3/3/2021   Trouble eating solid food (meat, bread, vegetables) 5   Trouble eating soft foods (yogurt, jello, pudding) 5   Trouble swallowing liquids 3   Pain while swallowing 3   Coughing or choking while swallowing foods or liquids 2   Total Score: 18     BEDQ Questionnaire: Discomfort/Pain Ratings 3/3/2021   Eating solid food (meat, bread, vegetables) 5   Eating soft foods (yogurt, jello,  "pudding) 5   Drinking liquid 0   Total Score: 10       Eckardt Questionnaire  Eckardt Questionnaire 3/3/2021   Dysphagia 3   Regurgitation 0   Retrosternal Pain 0   Weight Loss (kg) 0   Total Score:  3       Promis 10 Questionnaire  PROMIS 10 FLOWSHEET DATA 3/3/2021   In general, would you say your health is: 4   In general, would you say your quality of life is: 4   In general, how would you rate your physical health? 4   In general, how would you rate your mental health, including your mood and your ability to think? 3   In general, how would you rate your satisfaction with your social activities and relationships? 3   In general, please rate how well you carry out your usual social activities and roles. (This includes activities at home, at work and in your community, and responsibilities as a parent, child, spouse, employee, friend, etc.) 3   To what extent are you able to carry out your everyday physical activities such as walking, climbing stairs, carrying groceries, or moving a chair? 4   In the past 7 days, how often have you been bothered by emotional problems such as feeling anxious, depressed, or irritable? 1   In the past 7 days, how would you rate your fatigue on average? 2   In the past 7 days, how would you rate your pain on average, where 0 means no pain, and 10 means worst imaginable pain? 2   Mental health question re-calculation - no clinical value 5   Physical health question re-calculation - no clinical value 4   Pain question re-calculation - no clinical value 4   Global Mental Health Score 15   Global Physical Health Score 16   PROMIS TOTAL - SUBSCORES 31       .    Patient Hx  Patient's history, medications and allergies were reviewed.     Height: 5' 11\"   Weight: 190 lbs 0 oz    Patient Active Problem List    Diagnosis Date Noted     Yeast dermatitis 09/23/2018     Priority: Medium     Insomnia 03/09/2018     Priority: Medium     Acid reflux 08/20/2013     Priority: Medium     Anxiety state " 07/07/2011     Priority: Medium     Otitis media, chronic 07/07/2011     Priority: Medium     Posttraumatic stress disorder 07/07/2011     Priority: Medium     Seborrhea capitis 07/07/2011     Priority: Medium     Nonallopathic lesion of pelvic region 10/25/2010     Priority: Medium     Nonallopathic lesion of lumbar region 10/25/2010     Priority: Medium     Nonallopathic lesion of thoracic region 10/25/2010     Priority: Medium      Prior to Admission medications    Medication Sig Start Date End Date Taking? Authorizing Provider   acetaminophen (TYLENOL) 325 MG tablet Take 650 mg by mouth    Reported, Patient   ibuprofen (ADVIL/MOTRIN) 200 MG tablet Take 200 mg by mouth    Reported, Patient   lisinopril (ZESTRIL) 10 MG tablet Take 10 mg by mouth 1/7/21   Reported, Patient     Allergies   Allergen Reactions     Amoxicillin Unknown     Penicillins Unknown     Past Medical History:   Diagnosis Date     Pain in joint, lower leg     hx of     Personal history of other specified diseases     No Comments Provided     Past Surgical History:   Procedure Laterality Date     OTHER SURGICAL HISTORY      [id number],MASTOIDECTOMY,and reconstruction, multiple ear surgeries     Family History   Problem Relation Age of Onset     Other - See Comments Father         Psychiatric illness,bipolar     Social History     Tobacco Use     Smoking status: Former Smoker     Packs/day: 1.00     Years: 8.00     Pack years: 8.00     Types: Cigarettes     Quit date: 7/5/2010     Years since quitting: 10.6     Smokeless tobacco: Current User     Types: Chew     Tobacco comment: Quit smoking: Quit 3 years ago   Substance Use Topics     Alcohol use: Yes     Comment: occasional        Pre-Procedure Education & Consent  Procedure education was provided to: Patient  Teaching method: Explanation  Barriers to learning: No Barrier    Patient indicated understanding of pre-procedure instruction and appropriate consent was obtained and  documented.    ____________________________________________________________________    Post-Procedure Documentation: Esophageal Manometry    Manometry catheter was placed via right nare to 55 cm and normal saline swallows given per protocol. Manometry catheter was removed at the end of test.    Discharge instructions given to patient.    Notification of pending test results sent to provider for interpretation. Please reference scanned document for final interpretation of results. Patient will follow up with referring provider for test results.    Sachi Lindsey RN on 3/3/2021 at 11:17 AM

## 2021-03-06 ENCOUNTER — HEALTH MAINTENANCE LETTER (OUTPATIENT)
Age: 36
End: 2021-03-06

## 2021-03-08 ENCOUNTER — PREP FOR PROCEDURE (OUTPATIENT)
Dept: GASTROENTEROLOGY | Facility: CLINIC | Age: 36
End: 2021-03-08

## 2021-03-08 ENCOUNTER — PATIENT OUTREACH (OUTPATIENT)
Dept: GASTROENTEROLOGY | Facility: CLINIC | Age: 36
End: 2021-03-08

## 2021-03-08 DIAGNOSIS — K22.0 ACHALASIA: Primary | ICD-10-CM

## 2021-03-08 NOTE — PROGRESS NOTES
Called patient,  message left with contact information given. Will message the case info below to .  Case request is ordered    Procedure/Imaging/Clinic: EGD with SHONA   Physician: Raymond   Timing: next available   Procedure length: 60 min   Anesthesia: Gen   Dx: type II achalasia   Tier: 3   Location: [facility]     Kim Khan RN, BSN,   [facility]  Care coordinator   [phone]  Fax [phone]

## 2021-03-09 ENCOUNTER — PATIENT OUTREACH (OUTPATIENT)
Dept: GASTROENTEROLOGY | Facility: CLINIC | Age: 36
End: 2021-03-09

## 2021-03-09 NOTE — PROGRESS NOTES
Care Coordination Telephone Call  [facility]     Called by patient to discuss preoperative information.    We have reviewed the preoperative instructions and they have been sent via my chart at patient request and we have reviewed them.    I have asked the patient to call with any additional questions or concerns and have provided my contact information.    Plan:  Scheduling to contact patient for POEM procedure    Sonja KEENEN, HNBC, STAR-T  RN Care Coordinator  [facility]  Ph: [phone]  FAX: [phone]

## 2021-03-10 ENCOUNTER — PREP FOR PROCEDURE (OUTPATIENT)
Dept: GASTROENTEROLOGY | Facility: CLINIC | Age: 36
End: 2021-03-10

## 2021-03-10 DIAGNOSIS — K22.0 ACHALASIA: Primary | ICD-10-CM

## 2021-03-17 ENCOUNTER — TELEPHONE (OUTPATIENT)
Dept: GASTROENTEROLOGY | Facility: CLINIC | Age: 36
End: 2021-03-17

## 2021-03-17 ENCOUNTER — VIRTUAL VISIT (OUTPATIENT)
Dept: GASTROENTEROLOGY | Facility: CLINIC | Age: 36
End: 2021-03-17
Attending: INTERNAL MEDICINE
Payer: COMMERCIAL

## 2021-03-17 ENCOUNTER — HOSPITAL ENCOUNTER (INPATIENT)
Facility: CLINIC | Age: 36
Setting detail: SURGERY ADMIT
End: 2021-03-17
Attending: INTERNAL MEDICINE | Admitting: INTERNAL MEDICINE
Payer: COMMERCIAL

## 2021-03-17 DIAGNOSIS — K22.0 ACHALASIA: Primary | ICD-10-CM

## 2021-03-17 DIAGNOSIS — K22.0 ACHALASIA: ICD-10-CM

## 2021-03-17 PROCEDURE — 99213 OFFICE O/P EST LOW 20 MIN: CPT | Mod: 95 | Performed by: INTERNAL MEDICINE

## 2021-03-17 PROCEDURE — 999N001193 HC VIDEO/TELEPHONE VISIT; NO CHARGE

## 2021-03-17 NOTE — PROGRESS NOTES
"Adan is a 35 year old who is being evaluated via a billable video visit.      How would you like to obtain your AVS? MyChart  If the video visit is dropped, the invitation should be resent by: Text to cell phone: [phone]  Will anyone else be joining your video visit? No       I have reviewed and updated the patient's allergies and medication list.    Concerns: none  Refills: none     Vitals - Patient Reported  Weight (Patient Reported): 86.2 kg (190 lb)  Height (Patient Reported): 180.3 cm (5' 11\")  BMI (Based on Pt Reported Ht/Wt): 26.5  Pain Score: No Pain (0)    Rachel Otto CMA        Video Start Time: 8:57 AM  Video-Visit Details    Type of service:  Video Visit    Video End Time:9:06 AM    Originating Location (pt. Location): Home    Distant Location (provider location):  [facility]     Platform used for Video Visit: [employer]    INTERVENTIONAL ENDOSCOPY OUTPATIENT CLINIC FOLLOW-UP  DATE OF SERVICE: 3/17/2021  PHYSICIAN REQUESTING CONSULT: Efrain Andrade MD  Reason for Consultation: Type II achalasia    ASSESSMENT:  Adan is a 35 year old male who presents for follow up for recently diagnosed type II achalasia after his manometry. I reviewed his manometry study which showed IRP of 31 and absent peristalsis confirming type II achalasia. We discussed the various interventions for this as we did during his last visit. I explained the POEM procedure in detail including the risks/benefits. Explained the risk of GERD following the procedure.    I explained that she would be admitted after the procedure for observation with an esophagram the following morning to rule out a leak. I also explained that BID PPI for 1 month following the procedure will be needed. He is agreeable to proceeding and we are awaiting insurance authorization to schedule.    RECOMMENDATIONS:  - Schedule for EGD with POEM      Efrain Andrade MD  [facility] [place]  Division of Gastroenterology " and Hepatology  [facility] [address] [place], [place] [address]    ________________________________________________________________  HPI:  Adan is a 35 year old male who presents for follow up for recently diagnosed type II achalasia after his manometry. Dysphagia symptoms unchanged from prior. Egar to have intervention to resolve achalasia. Awaiting insurance authorization.    PMHx:  Past Medical History:   Diagnosis Date     Pain in joint, lower leg     hx of     Personal history of other specified diseases     No Comments Provided       PSurgHx:  Past Surgical History:   Procedure Laterality Date     OTHER SURGICAL HISTORY      [id number],MASTOIDECTOMY,and reconstruction, multiple ear surgeries       MEDS:  Current Outpatient Medications   Medication     acetaminophen (TYLENOL) 325 MG tablet     ibuprofen (ADVIL/MOTRIN) 200 MG tablet     lisinopril (ZESTRIL) 10 MG tablet     No current facility-administered medications for this visit.      ALLERGIES:    Allergies   Allergen Reactions     Amoxicillin Unknown     Penicillins Unknown       Physical Exam  Gen: A&Ox3, NAD  HEENT: Moist mucus membranes, no scleral icterus.  Lungs: no respiratory distress    LABS:  Office Visit on 02/27/2021   Component Date Value Ref Range Status     COVID-19 Virus PCR to [facility] - So* 02/27/2021 Nasopharyngeal   Final     COVID-19 Virus PCR to [facility] - Re* 02/27/2021 Test received-See reflex to IDDL test SARS CoV2 (COVID-19) Virus RT-PCR   Final     SARS-CoV-2 Virus Specimen Source 02/27/2021 Nasopharyngeal   Final     SARS-CoV-2 PCR Result 02/27/2021 NEGATIVE   Final    SARS-CoV2 (COVID-19) RNA not detected, presumed negative.     SARS-CoV-2 PCR Comment 02/27/2021    Final                    Value:Testing was performed using the Xpert Xpress SARS-CoV-2 Assay on the Cepheid Gene-Xpert   Instrument Systems. Additional information about this Emergency Use Authorization (EUA)   assay can be found via the Lab Guide.       Comment: This test should be ordered for the detection of SARS-CoV-2 in individuals who   meet SARS-CoV-2 clinical and/or epidemiological criteria. Test performance is   unknown in asymptomatic patients.  This test is for in vitro diagnostic use under the FDA EUA for laboratories   certified under CLIA to perform high complexity testing. This test has not   been FDA cleared or approved.  A negative result does not rule out the presence of PCR inhibitors in the   specimen or target RNA in concentration below the limit of detection for the   assay. The possibility of a false negative should be considered if the   patient's recent exposure or clinical presentation suggests COVID-19.  This test was validated by the [facility]. This laboratory is certified under the Clinical   Laboratory Improvement Amendments of 1988 (CLIA-88) as qualified to perform   high complexity laboratory testing.         IMAGING:  PROCEDURE: XR ESOPHAGRAM     HISTORY:  Dysphagia, unspecified type     TECHNIQUE:  A biphasic esophagram was performed. Effervescent crystals  and thick followed by thin barium were administered orally. Cine  images of the cervical esophagus were included.      COMPARISON:  None.     FINDINGS:       Cervical Esophagus:  Cine imaging of the cervical esophagus was  performed in the AP and lateral projections. There was no obstruction  to the flow of barium through the cervical esophagus. There is no  extrinsic impression on or displacement of the cervical esophagus. No  diverticulum or stricture is seen.     Thoracic Esophagus:  Moderate to severe generalized thoracic  esophageal dysmotility is seen throughout the exam, with to and fro  movement, tertiary contractions and retention of oral contrast,  particularly when drinking prone. No stricture or mucosal abnormality  is identified. No hiatal hernia is identified.      Assessment for reflux is limited by retained  esophageal contrast.                                                                      IMPRESSION:       Moderate to advanced esophageal dysmotility.  Differential  considerations include the sequela of chronic reflux esophagitis and  connective tissue disorders/early achalasia. No beaking of the distal  esophagus at this time. Consider EGD with tissue sampling as well as  reflux workup.     Endoscopies:  10/05/2020  1:32 PM CDT  Formatting of this note might be different from the original.    Patient Name: KAREN VO  Date of Service: 10/05/2020  : 1985  Age: 35  Sex: M    MRN: [id number]        Patient Loc/Room: /  Provider: Brandt Ace MD, General Surgery    GI PROCEDURE     SITE:  [facility]  ORDERED BY:        PREOPERATIVE DIAGNOSIS: Dysphagia.    POSTOPERATIVE DIAGNOSIS: Same.    PROCEDURE: Upper gastrointestinal endoscopy.    ANESTHESIA: Monitored anesthesia care.    INDICATIONS: The patient has developed increasing problems with swallowing over the past few months and has had several episodes where food gets stuck. He says it is usually easy to initiate swallowing but food can go part way down and just sit there. He can wash his food down to assist passage. Liquids do not appear to be a problem. He has a history of some reflux symptoms, but they are not bad and he requires no medication for it. Family history is noncontributory. Risks and benefits of the procedure are discussed with the patient who understands and accepts. Procedure is elective.    DESCRIPTION OF PROCEDURE: The patient is brought to the endoscopy suite and placed in the left lateral recumbent position. The bite block is positioned and secured. Satisfactory monitored anesthesia care was provided by the anesthetist. This is utilized because of his symptoms of GERD. After a time-out, the Olympus gastroscope was introduced into the oropharynx and directed down the hypopharynx. The cords appeared to  be clean and move normally, swallowing proceeds normally. The gastroscope was easily introduced into the esophagus and passed distally. The esophageal mucosa is clean and free from ulceration examination for a Zenker diverticulum revealed no evidence of this being present. The squamocolumnar junction is free from erosions or ulceration. Bile reflux is present in the gastric pouch. There is minimal inflammation of the gastric mucosa and no ulceration. The pylorus appears to be functional. The duodenal bulb and 1st and 2nd portions of the duodenum are normal to exam. Reflex view of the GE junction from below is unremarkable. The scope is brought back through the squamocolumnar junction. Examination here revealed no evidence of a stricture or  inflammatory changes. There are some minimal changes of the mucosa compatible with chronic reflux, but these are not severe. Varices are not seen. Motility appears to be somewhat spastic. No obstructive phenomenon is identified. The scope was withdrawn and the patient awakened and transferred to the recovery room in satisfactory condition, having tolerated the procedure well. There were no apparent complications of the anesthetic or procedure. No tissues were removed. No electrocautery was employed.    FINDINGS: There is no ulceration and no Schatzki ring, or other signs of blockage is present. A hiatus hernia is not seen. Motility of the esophagus itself is suspect.    RECOMMENDATIONS: A Barium swallow may be of benefit in identifying the cause of his dysphagia as no source of obstruction was identified.     Joey Foster MD - 2020  9:17 AM CST  Formatting of this note might be different from the original.    Patient Name: KAREN VO  Date of Service: 2020  : 1985  Age: 35  Sex: M    MRN: [id number]        Patient Loc/Room: /  Provider: Joey Foster MD, Gastroenterology    GI PROCEDURE     SITE:  [facility]  ORDERED BY:         PROCEDURE: Interpretation of high resolution esophageal motility study.    INDICATION: History of atypical chest pain and chronic dysphagia with normal upper endoscopy. Bravo pH monitoring study performed off therapy was normal without signs of significant reflux. He had an esophagram locally that showed some tertiary contractions, as well as retained barium.    The high resolution esophageal motility catheter was advanced via the nares to the esophagus and the study was performed with liquid swallows.    The lower esophageal sphincter is identified between 48 and 52 cm. Resting LES pressure is above normal at 54 mmHg. Integrated residual pressures post-swallow at the LES also were significantly elevated at 46.2 mmHg. Percent relaxation of the LES is abnormal at 9. Motility was then assessed throughout the esophageal body. Distal contractile integral was normal at 1238.3. Only 20% of swallows were peristaltic while 80% were simultaneous. There were no failed swallows.    IMPRESSION: Abnormal esophageal motility study. Given the significant increase in integrated residual pressures at the LES post-swallow, I am concerned that this represents achalasia. He does not have any failed swallows to suggest type 1 and distal contractile integral is negative, arguing against any sort of spasm. I am suspicious this may represent a type 2 achalasia. Reviewing the local esophagram, he does have some dilatation of the esophagus, in addition to barium retention, which also could fit for achalasia.    RECOMMENDATIONS: Given his age and good candidacy for surgical myotomy, I would recommend a referral to the [facility] for POEM.      2020 11:42 AM CST  Formatting of this note might be different from the original.    Patient Name: KAREN VO.  Date of Service: 2020  : 1985  Age: 35  Sex: M    MRN: [id number]        Patient Loc/Room: /  Provider: Joey Foster MD, Gastroenterology    GI  PROCEDURE     SITE:    ORDERED BY:        PROCEDURE: Interpretation of 48-hour Bravo pH monitoring study.    INDICATION: History of atypical chest pain, rule out reflux. The patient underwent upper endoscopy on 11/19/2020 with findings of a normal esophagus. Random biopsies were normal without evidence of eosinophilic esophagitis. The Bravo monitor was placed at that time for pH monitoring off therapy with proton pump inhibitors.    The 48-hour study is reviewed. Over that time only 1 reflux event occurred. Acid exposure time was normal at 0.2 and calculated DeMeester score was well within normal limits at 1.8. The event that did occur was on day 2. The patient's symptom diary was reviewed. It appears that he defaulted twice for cough, but not related to chest pain. Unfortunately, he did not default on the recording device with symptoms and, therefore, a symptom association probability was not calculated.    IMPRESSION: Normal 48-hour pH monitoring study without evidence of reflux. I would continue him off proton pump inhibitor therapy.

## 2021-03-17 NOTE — TELEPHONE ENCOUNTER
Patient called back to say 4/16/2021 works for his schedule. [facility]

## 2021-03-17 NOTE — TELEPHONE ENCOUNTER
Spoke to patient in regards to scheduled procedure. Informed patient he is scheduled with Dr. Andrade on 4/16/2021. Patient stated that should work but he just wanted to confirm with his boss and asked for some other dates just in case. Informed patient he will need an updated pre-op physical within 30 days of him procedure and a COVID-19 test within 96-72 hours of procedure date. Patient stated he is going to have this done locally. Informed patient him will need a  and someone to monitor him for 24 hours after the procedure. In[facility] patient [facility] is [facility] by patient.

## 2021-03-17 NOTE — LETTER
"    3/17/2021         RE: Adan Daugherty  [address]  [place] [address]        Dear Colleague,    Thank you for referring your patient, Adan Daugherty, to the [facility]CER [facility]. Please see a copy of my visit note below.    Adna is a 35 year old who is being evaluated via a billable video visit.      How would you like to obtain your AVS? MyChart  If the video visit is dropped, the invitation should be resent by: Text to cell phone: [phone]  Will anyone else be joining your video visit? No       I have reviewed and updated the patient's allergies and medication list.    Concerns: none  Refills: none     Vitals - Patient Reported  Weight (Patient Reported): 86.2 kg (190 lb)  Height (Patient Reported): 180.3 cm (5' 11\")  BMI (Based on Pt Reported Ht/Wt): 26.5  Pain Score: No Pain (0)    Rachel Otto CMA      Video-Visit Details    Type of service:  Video Visit    Video Start Time: 8:57 AM  Video End Time:9:06 AM    Originating Location (pt. Location): Home    Distant Location (provider location):  [facility]CER [facility]     Platform used for Video Visit: [facility]    INTERVENTIONAL ENDOSCOPY OUTPATIENT CLINIC FOLLOW-UP  DATE OF SERVICE: 3/17/2021  PHYSICIAN REQUESTING CONSULT: Efrain Andrade MD  Reason for Consultation: Type II achalasia    ASSESSMENT:  Adan is a 35 year old male who presents for follow up for recently diagnosed type II achalasia after his manometry. I reviewed his manometry study which showed IRP of 31 and absent peristalsis confirming type II achalasia. We discussed the various interventions for this as we did during his last visit. I explained the POEM procedure in detail including the risks/benefits. Explained the risk of GERD following the procedure.    I explained that she would be admitted after the procedure for observation with an esophagram the following morning to rule out a leak. I also explained that BID PPI for 1 month " following the procedure will be needed. He is agreeable to proceeding and we are awaiting insurance authorization to schedule.    RECOMMENDATIONS:  - Schedule for EGD with SHONA Andrade MD  [facility]  Division of Gastroenterology and Hepatology  [facility] [address] [place], [place] [address]    ________________________________________________________________  HPI:  Adan is a 35 year old male who presents for follow up for recently diagnosed type II achalasia after his manometry. Dysphagia symptoms unchanged from prior. Egar to have intervention to resolve achalasia. Awaiting insurance authorization.    PMHx:  Past Medical History:   Diagnosis Date     Pain in joint, lower leg     hx of     Personal history of other specified diseases     No Comments Provided       PSurgHx:  Past Surgical History:   Procedure Laterality Date     OTHER SURGICAL HISTORY      [id number],MASTOIDECTOMY,and reconstruction, multiple ear surgeries       MEDS:  Current Outpatient Medications   Medication     acetaminophen (TYLENOL) 325 MG tablet     ibuprofen (ADVIL/MOTRIN) 200 MG tablet     lisinopril (ZESTRIL) 10 MG tablet     No current facility-administered medications for this visit.      ALLERGIES:    Allergies   Allergen Reactions     Amoxicillin Unknown     Penicillins Unknown       Physical Exam  Gen: A&Ox3, NAD  HEENT: Moist mucus membranes, no scleral icterus.  Lungs: no respiratory distress    LABS:  Office Visit on 02/27/2021   Component Date Value Ref Range Status     COVID-19 Virus PCR to [facility] - So* 02/27/2021 Nasopharyngeal   Final     COVID-19 Virus PCR to [facility] - Re* 02/27/2021 Test received-See reflex to IDDL test SARS CoV2 (COVID-19) Virus RT-PCR   Final     SARS-CoV-2 Virus Specimen Source 02/27/2021 Nasopharyngeal   Final     SARS-CoV-2 PCR Result 02/27/2021 NEGATIVE   Final    SARS-CoV2 (COVID-19) RNA not detected, presumed negative.     SARS-CoV-2 PCR Comment  02/27/2021    Final                    Value:Testing was performed using the Xpert Xpress SARS-CoV-2 Assay on the Cepheid Gene-Xpert   Instrument Systems. Additional information about this Emergency Use Authorization (EUA)   assay can be found via the Lab Guide.      Comment: This test should be ordered for the detection of SARS-CoV-2 in individuals who   meet SARS-CoV-2 clinical and/or epidemiological criteria. Test performance is   unknown in asymptomatic patients.  This test is for in vitro diagnostic use under the FDA EUA for laboratories   certified under CLIA to perform high complexity testing. This test has not   been FDA cleared or approved.  A negative result does not rule out the presence of PCR inhibitors in the   specimen or target RNA in concentration below the limit of detection for the   assay. The possibility of a false negative should be considered if the   patient's recent exposure or clinical presentation suggests COVID-19.  This test was validated by the [facility]. This laboratory is certified under the Clinical   Laboratory Improvement Amendments of 1988 (CLIA-88) as qualified to perform   high complexity laboratory testing.         IMAGING:  PROCEDURE: XR ESOPHAGRAM     HISTORY:  Dysphagia, unspecified type     TECHNIQUE:  A biphasic esophagram was performed. Effervescent crystals  and thick followed by thin barium were administered orally. Cine  images of the cervical esophagus were included.      COMPARISON:  None.     FINDINGS:       Cervical Esophagus:  Cine imaging of the cervical esophagus was  performed in the AP and lateral projections. There was no obstruction  to the flow of barium through the cervical esophagus. There is no  extrinsic impression on or displacement of the cervical esophagus. No  diverticulum or stricture is seen.     Thoracic Esophagus:  Moderate to severe generalized thoracic  esophageal dysmotility is seen  throughout the exam, with to and fro  movement, tertiary contractions and retention of oral contrast,  particularly when drinking prone. No stricture or mucosal abnormality  is identified. No hiatal hernia is identified.      Assessment for reflux is limited by retained esophageal contrast.                                                                      IMPRESSION:       Moderate to advanced esophageal dysmotility.  Differential  considerations include the sequela of chronic reflux esophagitis and  connective tissue disorders/early achalasia. No beaking of the distal  esophagus at this time. Consider EGD with tissue sampling as well as  reflux workup.     Endoscopies:  10/05/2020  1:32 PM CDT  Formatting of this note might be different from the original.    Patient Name: KAREN VO.  Date of Service: 10/05/2020  : 1985  Age: 35  Sex: M    MRN: [id number]        Patient Loc/Room: /  Provider: Brandt Ace MD, General Surgery    GI PROCEDURE     SITE:  [facility]  ORDERED BY:        PREOPERATIVE DIAGNOSIS: Dysphagia.    POSTOPERATIVE DIAGNOSIS: Same.    PROCEDURE: Upper gastrointestinal endoscopy.    ANESTHESIA: Monitored anesthesia care.    INDICATIONS: The patient has developed increasing problems with swallowing over the past few months and has had several episodes where food gets stuck. He says it is usually easy to initiate swallowing but food can go part way down and just sit there. He can wash his food down to assist passage. Liquids do not appear to be a problem. He has a history of some reflux symptoms, but they are not bad and he requires no medication for it. Family history is noncontributory. Risks and benefits of the procedure are discussed with the patient who understands and accepts. Procedure is elective.    DESCRIPTION OF PROCEDURE: The patient is brought to the endoscopy suite and placed in the left lateral recumbent position. The bite block is  positioned and secured. Satisfactory monitored anesthesia care was provided by the anesthetist. This is utilized because of his symptoms of GERD. After a time-out, the Olympus gastroscope was introduced into the oropharynx and directed down the hypopharynx. The cords appeared to be clean and move normally, swallowing proceeds normally. The gastroscope was easily introduced into the esophagus and passed distally. The esophageal mucosa is clean and free from ulceration examination for a Zenker diverticulum revealed no evidence of this being present. The squamocolumnar junction is free from erosions or ulceration. Bile reflux is present in the gastric pouch. There is minimal inflammation of the gastric mucosa and no ulceration. The pylorus appears to be functional. The duodenal bulb and 1st and 2nd portions of the duodenum are normal to exam. Reflex view of the GE junction from below is unremarkable. The scope is brought back through the squamocolumnar junction. Examination here revealed no evidence of a stricture or  inflammatory changes. There are some minimal changes of the mucosa compatible with chronic reflux, but these are not severe. Varices are not seen. Motility appears to be somewhat spastic. No obstructive phenomenon is identified. The scope was withdrawn and the patient awakened and transferred to the recovery room in satisfactory condition, having tolerated the procedure well. There were no apparent complications of the anesthetic or procedure. No tissues were removed. No electrocautery was employed.    FINDINGS: There is no ulceration and no Schatzki ring, or other signs of blockage is present. A hiatus hernia is not seen. Motility of the esophagus itself is suspect.    RECOMMENDATIONS: A Barium swallow may be of benefit in identifying the cause of his dysphagia as no source of obstruction was identified.     Joey Foster MD - 12/31/2020  9:17 AM CST  Formatting of this note might be different from  the original.    Patient Name: KAREN VO  Date of Service: 2020  : 1985  Age: 35  Sex: M    MRN: [id number]        Patient Loc/Room: /  Provider: Joey Foster MD, Gastroenterology    GI PROCEDURE     SITE:  [facility]  ORDERED BY:        PROCEDURE: Interpretation of high resolution esophageal motility study.    INDICATION: History of atypical chest pain and chronic dysphagia with normal upper endoscopy. Bravo pH monitoring study performed off therapy was normal without signs of significant reflux. He had an esophagram locally that showed some tertiary contractions, as well as retained barium.    The high resolution esophageal motility catheter was advanced via the nares to the esophagus and the study was performed with liquid swallows.    The lower esophageal sphincter is identified between 48 and 52 cm. Resting LES pressure is above normal at 54 mmHg. Integrated residual pressures post-swallow at the LES also were significantly elevated at 46.2 mmHg. Percent relaxation of the LES is abnormal at 9. Motility was then assessed throughout the esophageal body. Distal contractile integral was normal at 1238.3. Only 20% of swallows were peristaltic while 80% were simultaneous. There were no failed swallows.    IMPRESSION: Abnormal esophageal motility study. Given the significant increase in integrated residual pressures at the LES post-swallow, I am concerned that this represents achalasia. He does not have any failed swallows to suggest type 1 and distal contractile integral is negative, arguing against any sort of spasm. I am suspicious this may represent a type 2 achalasia. Reviewing the local esophagram, he does have some dilatation of the esophagus, in addition to barium retention, which also could fit for achalasia.    RECOMMENDATIONS: Given his age and good candidacy for surgical myotomy, I would recommend a referral to the [facility] for POEM.      2020  11:42 AM CST  Formatting of this note might be different from the original.    Patient Name: KAREN VO  Date of Service: 2020  : 1985  Age: 35  Sex: M    MRN: [id number]        Patient Loc/Room: /  Provider: Joey Foster MD, Gastroenterology    GI PROCEDURE     SITE:    ORDERED BY:      PROCEDURE: Interpretation of 48-hour Bravo pH monitoring study.    INDICATION: History of atypical chest pain, rule out reflux. The patient underwent upper endoscopy on 2020 with findings of a normal esophagus. Random biopsies were normal without evidence of eosinophilic esophagitis. The Bravo monitor was placed at that time for pH monitoring off therapy with proton pump inhibitors.    The 48-hour study is reviewed. Over that time only 1 reflux event occurred. Acid exposure time was normal at 0.2 and calculated DeMeester score was well within normal limits at 1.8. The event that did occur was on day 2. The patient's symptom diary was reviewed. It appears that he defaulted twice for cough, but not related to chest pain. Unfortunately, he did not default on the recording device with symptoms and, therefore, a symptom association probability was not calculated.    IMPRESSION: Normal 48-hour pH monitoring study without evidence of reflux. I would continue him off proton pump inhibitor therapy.    Again, thank you for allowing me to participate in the care of your patient.      Sincerely,    Efrain Andrade MD

## 2021-03-29 DIAGNOSIS — Z11.59 ENCOUNTER FOR SCREENING FOR OTHER VIRAL DISEASES: ICD-10-CM

## 2021-04-07 RX ORDER — LIDOCAINE 40 MG/G
CREAM TOPICAL
Status: CANCELLED | OUTPATIENT
Start: 2021-04-07

## 2021-04-12 ENCOUNTER — PATIENT OUTREACH (OUTPATIENT)
Dept: GASTROENTEROLOGY | Facility: CLINIC | Age: 36
End: 2021-04-12

## 2021-04-12 NOTE — PROGRESS NOTES
Called patient to follow up on planned procedure POEM on 4/16. Pt planning to get his pre op physical and COVID test tomorrow and I gave him the appropriate fax numbers to send to.   Discussed dietary restrictions with him and sent a [employer] message with details.   Pt did mention that he may need to drink some water in the 8 hours prior d/t that is all that takes away his pain. I advised him to try not to do this, with the NPO 8 hour prior recommendations, but if he has to drink something then to tell the anesthesia staff on the day of surgery.       Kim Khan, RN, BSN,   [facility]  Care coordinator   [phone]  Fax [phone]

## 2021-04-15 ENCOUNTER — PATIENT OUTREACH (OUTPATIENT)
Dept: GASTROENTEROLOGY | Facility: CLINIC | Age: 36
End: 2021-04-15

## 2021-04-15 NOTE — PROGRESS NOTES
"Called patient, left VM,  to discuss potential lack of coverage for POEM procedure planned for tomorrow. Per financial \"criteria\" for procedure not met. Have called Fadumo from  financial and left message.   Will follow up with patient and potentially cancel procedure tomorrow.    Kim Khan, RN, BSN,   [facility]  Care coordinator   [phone]  Fax [phone]    "

## 2021-04-15 NOTE — PROGRESS NOTES
Adan called back to discuss insurance coverage of procedure. Does have secondary insurance IM care, which I shared with Rosibel at [employer]. Rosibel is going to make calls to see if this insurance will cover procedure.   Pt is aware the procedure may be cancelled for tomorrow if not covered.    Kim Khan, RN, BSN,   [facility]  Care coordinator   [phone]  Fax [phone]

## 2021-05-20 ENCOUNTER — TELEPHONE (OUTPATIENT)
Dept: GASTROENTEROLOGY | Facility: CLINIC | Age: 36
End: 2021-05-20

## 2021-05-20 NOTE — TELEPHONE ENCOUNTER
Received a call from [facility] that a prior authorization is requested for procedure, despite the understanding that this was already done previously with the denied procedure that was planned for 4/16. Calls made to financial securing dept, left VM's.   Prior auth should be sent attn: Dorothy, marked as urgent (per [facility] recs)  Fax [phone]    5/21/21 0844  Spoke with Fadumo, from [employer], confirm that the insurance states they can not find the initial documentation for clearance for procedure. Fadumo will be faxing request back to insurance urgently.  Will follow up with Fadumo on Monday.    Pt has been updated via [facility] Khan RN, BSN,   [facility]  Care coordinator   [phone]  Fax [phone]

## 2021-05-28 ENCOUNTER — PATIENT OUTREACH (OUTPATIENT)
Dept: GASTROENTEROLOGY | Facility: CLINIC | Age: 36
End: 2021-05-28

## 2021-05-28 DIAGNOSIS — K22.0 ACHALASIA: Primary | ICD-10-CM

## 2021-05-28 NOTE — PROGRESS NOTES
Called patient to discuss that his procedure for POEM had been denied. After discussion with Dr Andrade, pt deemed appropriate for a thoracic referral for possible Heller Myotomy procedure. Explained to Adan that someone from scheduling would call him to arrange his care with the thoracic team.     Pt verbalized understanding. No questions    Kim Khan RN, BSN,   [facility]  Care coordinator   [phone]  Fax [phone]

## 2021-06-02 NOTE — TELEPHONE ENCOUNTER
RECORDS STATUS - ALL OTHER DIAGNOSIS      RECORDS RECEIVED FROM: Psychiatric/[facility]   DATE RECEIVED: 6/2/21   NOTES STATUS DETAILS   OFFICE NOTE from referring provider Alice Ramirez APRN CNS in  ONC SURGERY   OFFICE NOTE from medical oncologist NA    DISCHARGE SUMMARY from hospital NA    DISCHARGE REPORT from the ER NA    OPERATIVE REPORT [facility] 12/31/20: Motility Study (REQUESTED 6/2)  11/19/20, 10/5/20: EGD   MEDICATION LIST [facility]   CLINICAL TRIAL TREATMENTS TO DATE     LABS     PATHOLOGY REPORTS [facility] 11/19/20: [id number]   ANYTHING RELATED TO DIAGNOSIS Epic/ 4/14/21   GENONOMIC TESTING     TYPE:     IMAGING (NEED IMAGES & REPORT)     XR Esophagram PACS 10/7/20: Epic   CT SCANS     MRI     MAMMO     ULTRASOUND     PET

## 2021-06-08 ENCOUNTER — PREP FOR PROCEDURE (OUTPATIENT)
Dept: SURGERY | Facility: CLINIC | Age: 36
End: 2021-06-08

## 2021-06-08 ENCOUNTER — VIRTUAL VISIT (OUTPATIENT)
Dept: SURGERY | Facility: CLINIC | Age: 36
End: 2021-06-08
Attending: CLINICAL NURSE SPECIALIST
Payer: COMMERCIAL

## 2021-06-08 ENCOUNTER — PRE VISIT (OUTPATIENT)
Dept: SURGERY | Facility: CLINIC | Age: 36
End: 2021-06-08

## 2021-06-08 DIAGNOSIS — K22.0 ACHALASIA: Primary | ICD-10-CM

## 2021-06-08 DIAGNOSIS — K22.0 ACHALASIA: ICD-10-CM

## 2021-06-08 PROCEDURE — 999N001193 HC VIDEO/TELEPHONE VISIT; NO CHARGE

## 2021-06-08 PROCEDURE — 99203 OFFICE O/P NEW LOW 30 MIN: CPT | Mod: 95 | Performed by: THORACIC SURGERY (CARDIOTHORACIC VASCULAR SURGERY)

## 2021-06-08 RX ORDER — CLINDAMYCIN PHOSPHATE 900 MG/50ML
900 INJECTION, SOLUTION INTRAVENOUS SEE ADMIN INSTRUCTIONS
Status: CANCELLED | OUTPATIENT
Start: 2021-06-08

## 2021-06-08 RX ORDER — CLINDAMYCIN PHOSPHATE 900 MG/50ML
900 INJECTION, SOLUTION INTRAVENOUS
Status: CANCELLED | OUTPATIENT
Start: 2021-06-08

## 2021-06-08 NOTE — LETTER
"    6/8/2021         RE: Adan Daugherty  [address]  [place] [address]        Dear Colleague,    Thank you for referring your patient, Adan Daugherty, to the [facility]. Please see a copy of my visit note below.    Augustin is a 36 year old who is being evaluated via a billable video visit.      How would you like to obtain your AVS? MyChart  If the video visit is dropped, the invitation should be resent by: Text to cell phone: [phone]  Will anyone else be joining your video visit? No      Video Start Time: 4:02 PM  Video-Visit Details    Type of service:  Video Visit    Video End Time:4:09 PM, transitioned to phone visit and ended at 4:30 PM    Originating Location (pt. Location): Home    Distant Location (provider location):  [facility]     Platform used for Video Visit: [employer] and telephone    THORACIC SURGERY - NEW PATIENT OFFICE VISIT      Dear Dr Wade,    I saw Willow at Ms. Alice Smith's request in consultation for the evaluation and treatment of achalasia.     HPI  Augustin Daugherty is a 36 year-old man with achalasia diagnosed with manometry earlier this year. He was planned for POEM procedure with GI, however due to insurance issues was not able to have this done.   He has had about 10-11 years of progressive dysphagia, which eventually reached a baseline of dysphagia and a feeling of food sitting in his chest. This is caused by all foods, but less so water and juice. He needs to \"push the food down with water\" for every meal. He has no regurgitation. His weight fluctuates, but in a 10-15 pound range. t manometry and BRAVO testing in [place], and was referred to [facility], where these studies were repeated. Manometry demonstrated an aperistaltic esophagus with elevated residual pressure. BRAVO pH monitoring demonstrated one reflux event in 48 hours.    Of note, remembers having reflux as a kid when he drank lots of orange juice or " ate candy. Has not had reflux in a very long time.                   Previsit Tests   XR Esophagram 10/7/2020:      Manometry 3/3/2021:      PMH  Hypertension    PSH  Right inguinal hernia  Mastoidectomy and reconstruction (ear)  Plantar fasciitis release    ETOH: daily use, a couple beers after work  TOB: chewing tobacco, daily use    Physical examination  BMI 26.5 per chart  Deferred, video visit    From a personal perspective, he is an . He is  and has three children, ages 9 to 16.     IMPRESSION (K22.0) Achalasia      This is a 36 year-old man with type II achalasia.  He is a good surgical candidate.     PLAN  I spent 30 min on the date of the encounter in chart review, patient visit, review of tests, documentation and/or discussion with other providers about the issues documented above. I reviewed the plan as follows:  Procedure planned: Robot assisted laparoscopic Heller myotomy with Lavon fundoplication.  The risks include, but are not limited to the following.  There is a risk of injury to the stomach, esophagus or abdominal contents.  There is a risk of injury to the vagus nerves, which could result in functional emptying issues for the stomach.  There is a risk of problems with the wrap, which can include difficulty swallowing, persistent reflux and pain.  Coughing or retching/vomiting in the weeks after surgery can result in breakdown of the repair. The lung cavities may be entered and tubes placed to evacuate air and fluid.   Finally, there is a risk of death.     Consent: Pending  Necessary Preop Tests & Appointments: In-person visit, pre-op H/P with PCP  Regional Anesthesia Plan: .local   Anticoagulation Plan: None preop  Smoking Cessation: Discussed cessation of chewing tobacco. Mr. Daugherty is committed to quitting on his own.     All questions were answered and Augustin Daugherty is in agreement with the plan.  I appreciate the opportunity to participate in the care of your patient and  will keep you updated.  Sincerely,    Travon Benton MD       Again, thank you for allowing me to participate in the care of your patient.        Sincerely,        Travon Benton MD

## 2021-06-08 NOTE — PROGRESS NOTES
"Augustin is a 36 year old who is being evaluated via a billable video visit.      How would you like to obtain your AVS? MyChart  If the video visit is dropped, the invitation should be resent by: Text to cell phone: [phone]  Will anyone else be joining your video visit? No      Video Start Time: 4:02 PM  Video-Visit Details    Type of service:  Video Visit    Video End Time:4:09 PM, transitioned to phone visit and ended at 4:30 PM    Originating Location (pt. Location): Home    Distant Location (provider location):  [facility]     Platform used for Video Visit: AmdoForms and telephone    THORACIC SURGERY - NEW PATIENT OFFICE VISIT      Dear Dr Wade,    I saw Willow at Ms. Alice Smith's request in consultation for the evaluation and treatment of achalasia.     HPI  Augustin Daugherty is a 36 year-old man with achalasia diagnosed with manometry earlier this year. He was planned for POEM procedure with GI, however due to insurance issues was not able to have this done.   He has had about 10-11 years of progressive dysphagia, which eventually reached a baseline of dysphagia and a feeling of food sitting in his chest. This is caused by all foods, but less so water and juice. He needs to \"push the food down with water\" for every meal. He has no regurgitation. His weight fluctuates, but in a 10-15 pound range. t manometry and BRAVO testing in [place], and was referred to [facility], where these studies were repeated. Manometry demonstrated an aperistaltic esophagus with elevated residual pressure. BRAVO pH monitoring demonstrated one reflux event in 48 hours.    Of note, remembers having reflux as a kid when he drank lots of orange juice or ate candy. Has not had reflux in a very long time.                   Previsit Tests   XR Esophagram 10/7/2020:      Manometry 3/3/2021:      PMH  Hypertension    PSH  Right inguinal hernia  Mastoidectomy and reconstruction (ear)  Plantar fasciitis " release    ETOH: daily use, a couple beers after work  TOB: chewing tobacco, daily use    Physical examination  BMI 26.5 per chart  Deferred, video visit    From a personal perspective, he is an . He is  and has three children, ages 9 to 16.     IMPRESSION (K22.0) Achalasia      This is a 36 year-old man with type II achalasia.  He is a good surgical candidate.     PLAN  I spent 30 min on the date of the encounter in chart review, patient visit, review of tests, documentation and/or discussion with other providers about the issues documented above. I reviewed the plan as follows:  Procedure planned: Robot assisted laparoscopic Heller myotomy with Lavon fundoplication.  The risks include, but are not limited to the following.  There is a risk of injury to the stomach, esophagus or abdominal contents.  There is a risk of injury to the vagus nerves, which could result in functional emptying issues for the stomach.  There is a risk of problems with the wrap, which can include difficulty swallowing, persistent reflux and pain.  Coughing or retching/vomiting in the weeks after surgery can result in breakdown of the repair. The lung cavities may be entered and tubes placed to evacuate air and fluid.   Finally, there is a risk of death.     Consent: Pending  Necessary Preop Tests & Appointments: In-person visit, pre-op H/P with PCP  Regional Anesthesia Plan: .local   Anticoagulation Plan: None preop  Smoking Cessation: Discussed cessation of chewing tobacco. Mr. Daugherty is committed to quitting on his own.     All questions were answered and Augustin Daugherty is in agreement with the plan.  I appreciate the opportunity to participate in the care of your patient and will keep you updated.  Sincerely,    Travon Benton MD

## 2021-06-10 ENCOUNTER — PREP FOR PROCEDURE (OUTPATIENT)
Dept: SURGERY | Facility: CLINIC | Age: 36
End: 2021-06-10

## 2021-07-02 ENCOUNTER — PATIENT OUTREACH (OUTPATIENT)
Dept: GASTROENTEROLOGY | Facility: CLINIC | Age: 36
End: 2021-07-02

## 2021-07-02 NOTE — PROGRESS NOTES
"Pt called with concerns:  \"My symptoms are getting worse, I am choking on food everyday  Food gets stuck, I try pushing it down with liquids and it comes back up   It is any food, not tough foods to swallow ice cream  His wife called his Insurance (Alex Mckenzie) with concern over his worsening symptoms, they stated they are willing to cover it if a letter is written stating the necessity of the POEM procedure and that his symptoms are worsening and more frequent  Pt would prefer the POEM over the heller myotomy which he has been seen and worked up for.     Routed to Dr Raymond Khan, RN, BSN,   [facility]  Care coordinator   [phone]  Fax [phone]    "

## 2021-08-07 ENCOUNTER — TELEPHONE (OUTPATIENT)
Dept: SURGERY | Facility: CLINIC | Age: 36
End: 2021-08-07

## 2021-08-07 ENCOUNTER — HOSPITAL ENCOUNTER (INPATIENT)
Facility: CLINIC | Age: 36
Setting detail: SURGERY ADMIT
End: 2021-08-07
Attending: THORACIC SURGERY (CARDIOTHORACIC VASCULAR SURGERY) | Admitting: THORACIC SURGERY (CARDIOTHORACIC VASCULAR SURGERY)
Payer: COMMERCIAL

## 2021-08-07 NOTE — TELEPHONE ENCOUNTER
Spoke with patient to schedule procedure with Dr. Travon Benton    Procedure was scheduled on 10/29 at [facility] OR  Patient will have H&P with PAC    Patient is aware a COVID-19 test is needed before their procedure. The test should be with-in 4 days of their procedure.   Test Details: Will schedule at [place]    Patient is aware a / is needed day of surgery.   Surgery letter was sent via [employer], patient has my direct contact information for any further questions.

## 2021-08-09 NOTE — TELEPHONE ENCOUNTER
FUTURE VISIT INFORMATION      SURGERY INFORMATION:    Date: 10/29/21    Location: [facility] OR    Surgeon:  Travon Benton MD    Anesthesia Type:  general    Procedure: Robot assisted laparoscopic heller myotomy, rhonda fundoplication    Consult: virtual visit     RECORDS REQUESTED FROM:       Primary Care Provider:Dre Wade MD- [facility]    Most recent EKG+ [email or web address]-[facility]

## 2021-08-11 ENCOUNTER — MYC MEDICAL ADVICE (OUTPATIENT)
Dept: SURGERY | Facility: CLINIC | Age: 36
End: 2021-08-11

## 2021-08-13 ENCOUNTER — TELEPHONE (OUTPATIENT)
Dept: SURGERY | Facility: CLINIC | Age: 36
End: 2021-08-13

## 2021-08-13 NOTE — TELEPHONE ENCOUNTER
"I called Augustin to discuss what to expect after his planned surgery in October.   He wanted to know length of stay in the hospital, how much time he would be off work, on light duty, etc.    I told him this is variable but most patients' are in the hospital for 3-5 days.   He then asked if I would send this information to him via [employer] as he was \"in the middle of something\".   I will do that, and also will include our fax # so his employer can send FMLA or disability paperwork to us.  "

## 2021-08-16 ENCOUNTER — TELEPHONE (OUTPATIENT)
Dept: SURGERY | Facility: CLINIC | Age: 36
End: 2021-08-16

## 2021-08-16 NOTE — TELEPHONE ENCOUNTER
I had spoken last week with patient to answer his questions about post-op restrictions, etc.   He asked that I put this in a MyChart message as he was in the middle of something and was unable to talk.  I did that, and sent a 2nd message telling him to stop chewing tobacco 2-3 weeks before planned surgery in October and to let me know if he needed nicotine patches or Chantix Rx.

## 2021-10-03 DIAGNOSIS — Z11.59 ENCOUNTER FOR SCREENING FOR OTHER VIRAL DISEASES: ICD-10-CM

## 2021-10-06 NOTE — TELEPHONE ENCOUNTER
FUTURE VISIT INFORMATION        SURGERY INFORMATION:    Date: TBD    Consult: virtual visit      RECORDS REQUESTED FROM:         Primary Care Provider:Dre Wade MD- [facility]     Most recent EKG+ [email or web address]-[facility]

## 2021-10-09 ENCOUNTER — HEALTH MAINTENANCE LETTER (OUTPATIENT)
Age: 36
End: 2021-10-09

## 2021-10-18 DIAGNOSIS — Z11.59 ENCOUNTER FOR SCREENING FOR OTHER VIRAL DISEASES: ICD-10-CM

## 2021-10-18 NOTE — TELEPHONE ENCOUNTER
FUTURE VISIT INFORMATION        SURGERY INFORMATION:    Date: 21    Location: [facility] OR    Surgeon:  Travon Benton MD    Anesthesia Type:  general    Procedure: Robot assisted laparoscopic heller myotomy, rhonda fundoplication    Consult: virtual visit      RECORDS REQUESTED FROM:         Primary Care Provider:Dre Wade MD- [facility]     Most recent EKG+ [email or web address]-[facility]

## 2021-10-19 ENCOUNTER — PRE VISIT (OUTPATIENT)
Dept: SURGERY | Facility: CLINIC | Age: 36
End: 2021-10-19

## 2021-11-09 ENCOUNTER — PRE VISIT (OUTPATIENT)
Dept: SURGERY | Facility: CLINIC | Age: 36
End: 2021-11-09

## 2021-11-10 NOTE — TELEPHONE ENCOUNTER
FUTURE VISIT INFORMATION        SURGERY INFORMATION:    Date: 12/15/21    Location: [facility] OR    Surgeon:  Travon Benton MD    Anesthesia Type:  general    Procedure: Robot assisted laparoscopic heller myotomy, rhonda fundoplication    Consult: virtual visit      RECORDS REQUESTED FROM:         Primary Care Provider:Dre Wade MD- [facility]     Most recent EKG+ [email or web address]-[facility]

## 2021-11-16 ENCOUNTER — PRE VISIT (OUTPATIENT)
Dept: SURGERY | Facility: CLINIC | Age: 36
End: 2021-11-16

## 2021-12-07 ENCOUNTER — PRE VISIT (OUTPATIENT)
Dept: SURGERY | Facility: CLINIC | Age: 36
End: 2021-12-07

## 2022-02-26 ENCOUNTER — OFFICE VISIT (OUTPATIENT)
Dept: FAMILY MEDICINE | Facility: OTHER | Age: 37
End: 2022-02-26
Attending: PHYSICIAN ASSISTANT
Payer: COMMERCIAL

## 2022-02-26 VITALS
SYSTOLIC BLOOD PRESSURE: 128 MMHG | DIASTOLIC BLOOD PRESSURE: 84 MMHG | BODY MASS INDEX: 27.74 KG/M2 | WEIGHT: 198.9 LBS | RESPIRATION RATE: 18 BRPM | OXYGEN SATURATION: 98 % | TEMPERATURE: 97.6 F | HEART RATE: 78 BPM

## 2022-02-26 DIAGNOSIS — J40 BRONCHITIS: ICD-10-CM

## 2022-02-26 DIAGNOSIS — R05.9 COUGH: Primary | ICD-10-CM

## 2022-02-26 PROCEDURE — 99213 OFFICE O/P EST LOW 20 MIN: CPT | Performed by: PHYSICIAN ASSISTANT

## 2022-02-26 PROCEDURE — C9803 HOPD COVID-19 SPEC COLLECT: HCPCS | Performed by: PHYSICIAN ASSISTANT

## 2022-02-26 PROCEDURE — U0005 INFEC AGEN DETEC AMPLI PROBE: HCPCS | Mod: ZL | Performed by: PHYSICIAN ASSISTANT

## 2022-02-26 RX ORDER — PREDNISONE 20 MG/1
40 TABLET ORAL DAILY
Qty: 10 TABLET | Refills: 0 | Status: SHIPPED | OUTPATIENT
Start: 2022-02-26 | End: 2022-03-03

## 2022-02-26 RX ORDER — LISINOPRIL 10 MG/1
30 TABLET ORAL
COMMUNITY
Start: 2022-02-24

## 2022-02-26 RX ORDER — AZITHROMYCIN 250 MG/1
TABLET, FILM COATED ORAL
Qty: 6 TABLET | Refills: 0 | Status: SHIPPED | OUTPATIENT
Start: 2022-02-26 | End: 2022-03-03

## 2022-02-26 RX ORDER — TRAZODONE HYDROCHLORIDE 50 MG/1
1 TABLET, FILM COATED ORAL AT BEDTIME
COMMUNITY
Start: 2022-02-18 | End: 2023-11-12

## 2022-02-26 ASSESSMENT — PAIN SCALES - GENERAL: PAINLEVEL: NO PAIN (0)

## 2022-02-26 NOTE — PROGRESS NOTES
ASSESSMENT/PLAN:    I have reviewed the nursing notes.  I have reviewed the findings, diagnosis, plan and need for follow up with the patient.    1. Cough  - Symptomatic; Yes; 2/23/2022 COVID-19 Virus (Coronavirus) by PCR Nose  - Covid test is in process at this time.Please quarantine until results return which takes 24-72 hours.     If COVID testing is negative, symptoms are improving (no need for antipyretics/fever reducers, etc.), that patient can return to normal activities of daily living.    If you test positive for COVID (regardless of vaccination status): stay home for 5 days. If you have no symptoms or symptoms are resolving after 5 days, you may leave the house per CDC guidelines. Continue to wear a mask around others for 5 days. You should also be fever free for 24 hours without the use of fever reducers (Tylenol/Ibuprofen).     If exposed to COVID to an individual with COVID (if you have received your booster OR if you have completed your primary series of Pfizer or Moderna in last 6 months OR completed the Ken & Ken (J&J) vaccine in last two months): Wear a mask around others for 10 days, test on day 5 if possible.  If you develop symptoms, take a test and stay home.     If you were exposed to an individual with COVID (if it has been greater than 6 months since your Pfizer or Moderna vaccine and you have not yet received a booster, completed the Ken & Ken  (J&J) vaccine greater than 2 months ago not received a booster or are not vaccinated): Stay home for 5 days, after this continue to wear a mask for 5 days around others.  If you cannot quarantine per guidelines you should/must wear a mask for 10 days around others. Test on day 5 if possible. If you develop symptoms, take a test and stay home.     ** With all of the above must also be fever free for 24 hours without fever reducers (Tylenol or Ibuprofen).     Continue symptomatic remedies such as salt water gargles, increase hydration,  rest, alternating Tylenol and ibuprofen if able/needed, Vicks Vapor rub on the chest and other symptomatic remedies.    2. Bronchitis  - azithromycin (ZITHROMAX) 250 MG tablet; Take 2 tablets (500 mg) by mouth daily for 1 day, THEN 1 tablet (250 mg) daily for 4 days.  Dispense: 6 tablet; Refill: 0  - predniSONE (DELTASONE) 20 MG tablet; Take 2 tablets (40 mg) by mouth daily for 5 days  Dispense: 10 tablet; Refill: 0  - Vital signs stable. PE consistent with URI. COVID in process. CXR declined - recommend follow up    - Zpak and Prednisone prescribed due to recent pneumonia and original symptoms beginning as bronchitis and progressive to pneumonia.     Recommend: increased fluids, rest, use of humidifier, antitussives (cough medication for adults), alternating tylenol and ibuprofen every 4-6 hours as needed (if able to take these medications), salt water gargles for sore throats or throat lozenges, honey, netti pots/saline rinses for sinus/congestion, as well as other home remedies. If worsening fevers, chills, uncontrollable nausea/vomiting, dehydration,etc., or other worsening signs occur, patient is agreeable to follow up for reevaluation.     Patient is in agreement and understanding of the above treatment plan. All questions and concerns were addressed and answered to patient's satisfaction. AVS reviewed with patient.     Discussed warning signs/symptoms indicative of need to f/u    Follow up if symptoms persist or worsen or concerns    I explained my diagnostic considerations and recommendations to the patient, who voiced understanding and agreement with the treatment plan. All questions were answered. We discussed potential side effects of any prescribed or recommended therapies, as well as expectations for response to treatments.    Tea Castro PA-C  2/26/2022  10:34 AM    HPI:    Adan BARNES Willow is a 36 year old male  who presents to [facility] today for concerns of URI, x 3 days    Symptoms:  No  fevers or chills.   Yes sore throat/pharyngitis/tonsillitis.   Yes allergy/URI Symptoms - clear, sinus related  No Muffled Sounds/Change in Hearing  No Sensation of Fullness in Ear(s)  No Ringing in Ears/Tinnitus  No Balance Changes  No Dizziness  Yes Congestion (head/nasal/chest)  Yes Cough/Productive Cough - all day, mixed dry and production  Yes Post Nasal Drip - color: clear  No Headache  Yes Sinus Pain/Pressure  No Myalgias  No Otalgia  Activity Level Changes: Yes: decreased  Appetite/Liquid Intake Changes: No  Changes to Bowel Habits: No  Changes to Bladder Habits: No  Additional Symptoms to Report: No  History of similar symptoms: Yes - pneumonia  Prior workup: No    Treatments tried: Antihistamine, OTC Cough med, Fluids and Rest    Site of exposure: not known.  Type of exposure: not known    Vaccination status:   - Influenza: not vaccinated at this time  - COVID: not vaccinated at this time    Other Pertinent History: pneumonia 2 months prior     PCP: MD Sheri    Past Medical History:   Diagnosis Date     Hypertension      Pain in joint, lower leg     hx of     Personal history of other specified diseases     No Comments Provided     Past Surgical History:   Procedure Laterality Date     GI SURGERY       OTHER SURGICAL HISTORY      [id number],MASTOIDECTOMY,and reconstruction, multiple ear surgeries     Social History     Tobacco Use     Smoking status: Former Smoker     Packs/day: 1.00     Years: 8.00     Pack years: 8.00     Types: Cigarettes     Quit date: 2010     Years since quittin.6     Smokeless tobacco: Current User     Types: Chew     Tobacco comment: Quit smoking: Quit 3 years ago   Substance Use Topics     Alcohol use: Yes     Comment: occasional     Current Outpatient Medications   Medication Sig Dispense Refill     acetaminophen (TYLENOL) 325 MG tablet Take 650 mg by mouth every 4 hours as needed        ibuprofen (ADVIL/MOTRIN) 200 MG tablet Take 200 mg by mouth every 4 hours as needed         lisinopril (ZESTRIL) 10 MG tablet Take 30 mg by mouth       traZODone (DESYREL) 50 MG tablet Take 1 tablet by mouth At Bedtime       Allergies   Allergen Reactions     Amoxicillin Unknown     Penicillins Unknown     Past medical history, past surgical history, current medications and allergies reviewed and accurate to the best of my knowledge.      ROS:  Refer to HPI    /84   Pulse 78   Temp 97.6  F (36.4  C) (Tympanic)   Resp 18   Wt 90.2 kg (198 lb 14.4 oz)   SpO2 98%   BMI 27.74 kg/m      EXAM:  General Appearance: Well appearing 36 year old male, appropriate appearance for age. No acute distress   Ears: Left TM intact, translucent with bony landmarks appreciated, no erythema, no effusion, no bulging, no purulence.  Right TM intact, translucent with bony landmarks appreciated, no erythema, no effusion, no bulging, no purulence.  Left auditory canal clear.  Right auditory canal clear.  Normal external ears, non tender.  Eyes: conjunctivae normal without erythema or irritation, corneas clear, no drainage or crusting, no eyelid swelling, pupils equal   Orophayrnx: moist mucous membranes, posterior pharynx without erythema, tonsils symmetric, no erythema, no exudates or petechiae, no post nasal drip seen, no trismus, voice clear.    Sinuses:  No sinus tenderness upon palpation of the frontal or maxillary sinuses  Nose:  Bilateral nares: no erythema, no edema, no drainage or congestion   Neck: supple without adenopathy  Respiratory: normal chest wall and respirations.  Normal effort.  No crackles or rhonchi.  No increased work of breathing. Dry cough, mild LLL wheezing.   Cardiac: RRR with no murmurs  Dermatological: no rashes noted of exposed skin  Psychological: normal affect, alert, oriented, and pleasant.     Labs:  COVID in process    Xray:  Declined chest x-ray

## 2022-02-26 NOTE — PATIENT INSTRUCTIONS
If a COVID swab was performed:   Please quarantine until results return which takes 24-72 hours.     If COVID testing is negative, symptoms are improving (no need for antipyretics/fever reducers, etc.), that patient can return to normal activities of daily living.    If you test positive for COVID (regardless of vaccination status): stay home for 5 days. If you have no symptoms or symptoms are resolving after 5 days, you may leave the house per CDC guidelines. Continue to wear a mask around others for 5 days. You should also be fever free for 24 hours without the use of fever reducers (Tylenol/Ibuprofen).     If exposed to COVID to an individual with COVID (if you have received your booster OR if you have completed your primary series of Pfizer or Moderna in last 6 months OR completed the Ken & Ken (J&J) vaccine in last two months): Wear a mask around others for 10 days, test on day 5 if possible.  If you develop symptoms, take a test and stay home.     If you were exposed to an individual with COVID (if it has been greater than 6 months since your Pfizer or Moderna vaccine and you have not yet received a booster, completed the Ken & Ken  (J&J) vaccine greater than 2 months ago not received a booster or are not vaccinated): Stay home for 5 days, after this continue to wear a mask for 5 days around others.  If you cannot quarantine per guidelines you should/must wear a mask for 10 days around others. Test on day 5 if possible. If you develop symptoms, take a test and stay home.     ** Must also be fever free for 24 hours without fever reducers (Tylenol or Ibuprofen).     Please refer to your AVS for follow up and pain/symptoms management recommendations (I.e.: medications, helpful conservative treatment modalities, appropriate follow up if need to a specialist or family practice, etc.). Please return to urgent care if your symptoms change or worsen.     Discharge instructions:  -If you were prescribed  a medication(s), please take this as prescribed/directed  -Monitor your symptoms, if changing/worsening, return to UC/ER or PCP for follow up    Symptomatic treatments recommended.  - Antibiotics will not help with your symptoms, unless you were told otherwise today (strep throat, ear infection, etc. ). Education provided on symptoms of secondary bacterial infection such as new fever, chills, rigors, shortness of breath, increased work of breathing, that can occur with viral URI and need for further evaluation, if they occur.   - Ensure you are staying hydrated by drinking plenty of fluids and eating mild foods and advance diet as tolerated  - Honey can be soothing for sore throat (as long as above 12 months of age)  - Warm salt water gurgles can help soothe sore throat  - Humidifier can help with congestion and help keep mucus membranes such as throat and nose from drying out.  - Sleeping slightly propped up can help with congestion and postnasal drainage that can worsen cough at bedtime.  - As long as you have never been told to take Tylenol and/or Ibuprofen you can use them to manage fever and body aches per package instructions  Make sure you eat when you take ibuprofen to avoid stomach upset.  - OTC cough medications per package instructions to help with cough. Check to see if the cough/cold medication already has acetaminophen (Tylenol) in it. If it does avoid taking additional Tylenol.  - If sudden onset of new fever, worsening symptoms return for further evaluation.  - OTC antihistamine such as Allegra, Zyrtec, Claritin (generic is okay) can help with nasal/sinus congestion and OTC nasal steroid such as Flonase can help decrease sinus inflammation to help with congestion.  - Education provided on symptoms of post-viral bacterial infections including ear infection and pneumonia. This would require re-evaluation for treatment.

## 2022-02-26 NOTE — NURSING NOTE
"Chief Complaint   Patient presents with     Cough     He has been having a runny nose cough and congestion for the last 3 days. Cough has been getting worse. He does have a sore throat in the morning. He has been coughing up yellowish/greenish mucus. He only coughs things up when he is laying down.     Initial There were no vitals taken for this visit. Estimated body mass index is 26.5 kg/m  as calculated from the following:    Height as of 3/3/21: 1.803 m (5' 11\").    Weight as of 3/3/21: 86.2 kg (190 lb).         Asher Duvall LPN   "

## 2022-02-27 LAB — SARS-COV-2 RNA RESP QL NAA+PROBE: NEGATIVE

## 2022-05-22 ENCOUNTER — TRANSFERRED RECORDS (OUTPATIENT)
Dept: HEALTH INFORMATION MANAGEMENT | Facility: CLINIC | Age: 37
End: 2022-05-22

## 2022-05-22 LAB
ALT SERPL-CCNC: 26 IU/L (ref 6–40)
AST SERPL-CCNC: 24 IU/L (ref 10–40)
CREATININE (EXTERNAL): 0.73 MG/DL (ref 0.7–1.2)
GFR ESTIMATED (EXTERNAL): >60 ML/MIN/1.73M2
GLUCOSE (EXTERNAL): 97 MG/DL (ref 70–99)
POTASSIUM (EXTERNAL): 4.3 MEQ/L (ref 3.4–5.1)

## 2022-06-28 ENCOUNTER — TRANSFERRED RECORDS (OUTPATIENT)
Dept: HEALTH INFORMATION MANAGEMENT | Facility: CLINIC | Age: 37
End: 2022-06-28

## 2022-07-20 ENCOUNTER — MEDICAL CORRESPONDENCE (OUTPATIENT)
Dept: HEALTH INFORMATION MANAGEMENT | Facility: CLINIC | Age: 37
End: 2022-07-20

## 2022-07-22 ENCOUNTER — TRANSCRIBE ORDERS (OUTPATIENT)
Dept: OTHER | Age: 37
End: 2022-07-22

## 2022-07-22 DIAGNOSIS — K22.4 ESOPHAGEAL DYSMOTILITY: Primary | ICD-10-CM

## 2022-07-22 DIAGNOSIS — R13.19 ESOPHAGEAL DYSPHAGIA: Primary | ICD-10-CM

## 2022-07-25 ENCOUNTER — TELEPHONE (OUTPATIENT)
Dept: GASTROENTEROLOGY | Facility: CLINIC | Age: 37
End: 2022-07-25

## 2022-07-25 NOTE — TELEPHONE ENCOUNTER
Screening Questions    BlueKIND OF PREP RedLOCATION [review exclusion criteria] GreenSEDATION TYPE      1. Are you active on mychart? Y    2. What insurance is in the chart? BCBS     3.   Ordering/Referring Provider: Radha Plunkett PA-C    4. BMI   (If greater than 40 review exclusion criteria [PAC APPT IF [MAC] @ [facility])  27.6  [If yes, BMI OVER 40-EXTENDED PREP]      **(Sedation review/consideration needed)**  Do you have a legal guardian or Medical Power of    and/or are you able to give consent for your medical care?     Y    5. Have you had a positive covid test in the last 90 days?   N -     6.  Are you currently on dialysis?   N [ If yes, G-PREP & HOSPITAL setting ONLY]     7.  Do you have chronic kidney disease?  N [ If yes, G-PREP ]    8.   Do you have a diagnosis of diabetes?   N   [ If yes, G-PREP ]    9.  On a regular basis do you go 3-5 days between bowel movements?   Y   [ If yes, EXTENDED PREP]    10.  Are you taking any prescription pain medications on a routine schedule?    N -  [ If yes, EXTENDED PREP] [If yes, MAC]      11.   Do you have any chemical dependencies such as alcohol, street drugs, or methadone?    N [If yes, MAC]    12.   Do you have any history of post-traumatic stress syndrome, severe anxiety or history of psychosis?    N  [If yes, MAC]    13.  [FEMALES] Are you currently pregnant? NA    If yes, how many weeks?       Respiratory/Heart Screening:  [If yes to any of the following HOSPITAL setting only]     14. Do you have Pulmonary Hypertension [Lungs]?   N       15. Do you have UNCONTROLLED asthma?   N     16.  Do you use daily home oxygen?  N      17. Do you have mod to severe Obstructive Sleep Apnea?         (OKAY @ [facility]  [facility]  SH  PH  RI  MG - if pt is not on OXYGEN)  N      18.   Have you had a heart or lung transplant?   N      19.   Have you had a stroke or Transient ischemic attack (TIA - aka  mini stroke ) within 6 months?  (If yes, please review  exclusion criteria)  N     20.   In the past 6 months, have you had any heart related issues including cardiomyopathy or heart attack?   N           If yes, did it require cardiac stenting or other implantable device?   NA      21.   Do you have any implantable devices in your body (pacemaker, defib, LVAD)? (If yes, please review exclusion criteria)  N     22. Do you take nitroglycerin?   N           If yes, how often? NA  (if yes, HOSPITAL setting ONLY)    23.  Are you currently taking any blood thinners?    [If yes, INFORM patient to follw up w/ ORDERING PROVIDER FOR BRIDGING INSTRUCTIONS]     N    24.   Do you transfer independently?                (If NO, please HOSPITAL setting ONLY)  Y    25.   Preferred LOCAL Pharmacy for Pre Prescription:         [id number] - [place], [place] - [address] [facility]  [id number] - [place], [place] - [address]      Additional comments: Going to speak to PCP about why he needs this procedure then will call back to schedule.

## 2022-07-26 DIAGNOSIS — R13.19 ESOPHAGEAL DYSPHAGIA: Primary | ICD-10-CM

## 2022-07-26 NOTE — TELEPHONE ENCOUNTER
"---- Message -----  From: Adams Ireland RN  Sent: 7/26/2022   9:16 AM CDT  To: Sourav Ayala DO, *  Subject: RE: Upper Endoscopy                              Order has been changed to include endoFLIP    Adams  ----- Message -----  From: Sourav Bateman DO  Sent: 7/25/2022   3:16 PM CDT  To: Adams Ayala, RN, *  Subject: RE: Upper Endoscopy                              Should be EGD with endoFLIP. (Endoflip is performed during endoscopy and has to be at [facility])    johny  ----- Message -----  From: Dante Carney  Sent: 7/25/2022   1:24 PM CDT  To: Sourav Bateman DO, Sachi Lindsey RN  Subject: Upper Endoscopy                                  I just wanted to verify before I scheduled.     Currently there is an order this pt to get an EGD, but in the notes it stays this:    \"Reason for Upper Endoscopy:  Referral from outside GI for endoflip, persistent dysphagia after esophageal myotomy and hiatal hernia repair. Dr Bateman can discuss with referring provider Dr Bustillo by calling [phone].\"    Should this order be for an endoflip or just an EGD?    Thank you!    Dante Carney  Endo Scheduling Team               "

## 2022-07-27 ENCOUNTER — TELEPHONE (OUTPATIENT)
Dept: GASTROENTEROLOGY | Facility: CLINIC | Age: 37
End: 2022-07-27

## 2022-07-27 ENCOUNTER — HOSPITAL ENCOUNTER (OUTPATIENT)
Facility: AMBULATORY SURGERY CENTER | Age: 37
End: 2022-07-27
Attending: INTERNAL MEDICINE
Payer: COMMERCIAL

## 2022-07-27 ENCOUNTER — HOSPITAL ENCOUNTER (OUTPATIENT)
Facility: CLINIC | Age: 37
End: 2022-07-27
Attending: INTERNAL MEDICINE | Admitting: INTERNAL MEDICINE
Payer: COMMERCIAL

## 2022-07-27 NOTE — TELEPHONE ENCOUNTER
Reason for cancel (please be detailed, any staff messages or encounters to note?): PATIENT MIGHT END UP GOING SOMEWHERE ELSE DUE TO THE LONG WAIT         Rescheduled: YES     If rescheduled:    Date: 11/22   Location: [facility]    Prep Resent: N(changes to prep?)   Covid Test Rescheduled: Y   Note any change or update to original order/sedation: N

## 2022-07-27 NOTE — TELEPHONE ENCOUNTER
Screening Questions    BlueKIND OF PREP RedLOCATION [review exclusion criteria] GreenSEDATION TYPE      1. Are you active on mychart? y    2. What insurance is in the chart? BXBS     3.   Ordering/Referring Provider: Fransico    4. BMI   (If greater than 40 review exclusion criteria [PAC APPT IF [MAC] @ [facility])  25.4[If yes, BMI OVER 40-EXTENDED PREP]      **(Sedation review/consideration needed)**  Do you have a legal guardian or Medical Power of    and/or are you able to give consent for your medical care?     y    5. Have you had a positive covid test in the last 90 days?   n -     6.  Are you currently on dialysis?   n [ If yes, G-PREP & HOSPITAL setting ONLY]     7.  Do you have chronic kidney disease?  n [ If yes, G-PREP ]    8.   Do you have a diagnosis of diabetes?   n   [ If yes, G-PREP ]    9.  On a regular basis do you go 3-5 days between bowel movements?   y   [ If yes, EXTENDED PREP]    10.  Are you taking any prescription pain medications on a routine schedule?    n -  [ If yes, EXTENDED PREP] [If yes, MAC]      11.   Do you have any chemical dependencies such as alcohol, street drugs, or methadone?    n [If yes, MAC]    12.   Do you have any history of post-traumatic stress syndrome, severe anxiety or history of psychosis?    n  [If yes, MAC]    13.  [FEMALES] Are you currently pregnant?     If yes, how many weeks?       Respiratory/Heart Screening:  [If yes to any of the following HOSPITAL setting only]     14. Do you have Pulmonary Hypertension [Lungs]?   n       15. Do you have UNCONTROLLED asthma?   n     16.  Do you use daily home oxygen?  n      17. Do you have mod to severe Obstructive Sleep Apnea?         (OKAY @ [facility]  [facility]  SH  PH  RI  MG - if pt is not on OXYGEN)  n      18.   Have you had a heart or lung transplant?   n      19.   Have you had a stroke or Transient ischemic attack (TIA - aka  mini stroke ) within 6 months?  (If yes, please review exclusion criteria)  n     20.   In  the past 6 months, have you had any heart related issues including cardiomyopathy or heart attack?   n           If yes, did it require cardiac stenting or other implantable device?   n      21.   Do you have any implantable devices in your body (pacemaker, defib, LVAD)? (If yes, please review exclusion criteria)  n   22.  Do you take the medication Phentermine?n     Yes-> Hold for 7 days before procedure.  Please consult your prescribing provider if you have questions about holding this medication.     No-> Continue to next question.    23. Do you take nitroglycerin?   n           If yes, how often? n  (if yes, HOSPITAL setting ONLY)    24.  Are you currently taking any blood thinners?    [If yes, INFORM patient to follw up w/ ORDERING PROVIDER FOR BRIDGING INSTRUCTIONS]     n    25.   Do you transfer independently?                (If NO, please HOSPITAL setting ONLY)  y    26.   Preferred LOCAL Pharmacy for Pre Prescription:         [id number] - [place], [place] - [address] [facility] [id number] - [place], [place] - [address]    Scheduling Details  (Please ask for phone number if not scheduled by patient)      Caller : Adan Daugherty    Date of Procedure: 10/13  Surgeon: Fransico  [place] [facility] [place] Type: MAC   Conscious Sedation- Needs  for 6 hours after the procedure  MAC/General-Needs  for 24 hours after procedure    n :[Pre-op Required] at [facility]  SH  MG and OR for MAC sedation   (advise patient they will need a pre-op WITH IN 30 DAYS of procedure date)     Type of Procedure Scheduled:   Upper Endoscopy [EGD]    Which Colonoscopy Prep was Sent?:       [facility] PATIENTS & [facility] PATIENTS NEEDS EXTENDED PREP       Informed patient they will need an adult  y  Cannot take any type of public or medical transportation alone    [facility]Pro[facility] or Externally: y  **INFORMED OF  HOME TESTING & LAB OPTION**        Confirmed Nurse will call to complete assessment y    Additional comments:

## 2022-07-27 NOTE — TELEPHONE ENCOUNTER
Caller: Adan Daugherty    Procedure: EndoFlip    Date, Location, and Surgeon of Procedure Cancelled: 10/13, Fransico TO    Ordering Provider:Fransico    Reason for cancel (please be detailed, any staff messages or encounters to note?): Endoflip needs to be scheduled at [facility]      Rescheduled: NMILTONM for patient to call back to reschedule      If rescheduled:    Date:    Location:    Prep Resent: (changes to prep?)   Covid Test Rescheduled:    Note any change or update to original order/sedation:

## 2022-11-16 ENCOUNTER — TELEPHONE (OUTPATIENT)
Dept: GASTROENTEROLOGY | Facility: CLINIC | Age: 37
End: 2022-11-16

## 2022-11-16 NOTE — TELEPHONE ENCOUNTER
Attempted to contact patient regarding upcoming EGD endoflip procedure on 11.22.2022 for pre assessment questions. No answer.     Left message to return call to [phone] #4    Discuss at home rapid antigen COVID test 1-2 days prior to procedure.    Pre op exam?    Arrival time: 1245    Facility location: [facility]    Sedation type: MAC    Indication for procedure: dysphagia    Prep recommendation: achalasia noted in patient's chart; 24 hour CLD.    Rachel Maldonado RN

## 2022-11-17 NOTE — TELEPHONE ENCOUNTER
Second attempt for pre-assessment prior to upcoming EGD endoflip.    Patient stated he will call tomorrow as he is unsure if he is able to keep this appointment.  RN reminded pt of pre-op needed.    Rachel Maldonado RN

## 2022-11-20 RX ORDER — LIDOCAINE 40 MG/G
CREAM TOPICAL
Status: CANCELLED | OUTPATIENT
Start: 2022-11-20

## 2022-11-20 RX ORDER — ONDANSETRON 2 MG/ML
4 INJECTION INTRAMUSCULAR; INTRAVENOUS
Status: CANCELLED | OUTPATIENT
Start: 2022-11-20

## 2022-11-21 ENCOUNTER — MYC MEDICAL ADVICE (OUTPATIENT)
Dept: SURGERY | Facility: AMBULATORY SURGERY CENTER | Age: 37
End: 2022-11-21

## 2022-11-21 NOTE — TELEPHONE ENCOUNTER
Third attempt for pre-assessment prior to upcoming EGD endoflip.    No answer.  Left message to return call [phone] #4    Pre-op?    MyChart message sent.    Rachel Maldonado RN

## 2022-11-22 NOTE — TELEPHONE ENCOUNTER
Caller: patient    Procedure: EGD w/ ENDOFlip    Date, Location, and Surgeon of Procedure Cancelled: 11/22 [facility]    Ordering Provider:Iban    Reason for cancel (please be detailed, any staff messages or encounters to note?): Patient just started a new job and need time to figure out the slow season to schedule procedure at at future date. Patient has our number and will call when ready to schedule.        Rescheduled: no     If rescheduled:    Date:    Location:    Prep Resent: (changes to prep?)   Covid Test Rescheduled:    Note any change or update to original order/sedation:

## 2023-11-12 ENCOUNTER — OFFICE VISIT (OUTPATIENT)
Dept: FAMILY MEDICINE | Facility: OTHER | Age: 38
End: 2023-11-12
Payer: COMMERCIAL

## 2023-11-12 ENCOUNTER — HOSPITAL ENCOUNTER (OUTPATIENT)
Dept: GENERAL RADIOLOGY | Facility: OTHER | Age: 38
Discharge: HOME | End: 2023-11-12
Payer: COMMERCIAL

## 2023-11-12 VITALS
RESPIRATION RATE: 16 BRPM | OXYGEN SATURATION: 97 % | TEMPERATURE: 97.3 F | SYSTOLIC BLOOD PRESSURE: 122 MMHG | HEART RATE: 78 BPM | BODY MASS INDEX: 25.62 KG/M2 | WEIGHT: 183 LBS | HEIGHT: 71 IN | DIASTOLIC BLOOD PRESSURE: 76 MMHG

## 2023-11-12 DIAGNOSIS — R07.81 RIB PAIN ON RIGHT SIDE: ICD-10-CM

## 2023-11-12 DIAGNOSIS — S20.211A RIB CONTUSION, RIGHT, INITIAL ENCOUNTER: Primary | ICD-10-CM

## 2023-11-12 PROCEDURE — 99213 OFFICE O/P EST LOW 20 MIN: CPT

## 2023-11-12 PROCEDURE — G0463 HOSPITAL OUTPT CLINIC VISIT: HCPCS

## 2023-11-12 PROCEDURE — 71101 X-RAY EXAM UNILAT RIBS/CHEST: CPT | Mod: RT

## 2023-11-12 ASSESSMENT — PAIN SCALES - GENERAL: PAINLEVEL: MILD PAIN (3)

## 2023-11-12 NOTE — NURSING NOTE
"Patient presents to the clinic for lifting something heavy at work last week.  Patient used his chest as leverage and felt/heard a pop in the right rib area.  Patient does not want to pursue work comp claim at this time.    FOOD SECURITY SCREENING QUESTIONS:    The next two questions are to help us understand your food security.  If you are feeling you need any assistance in this area, we have resources available to support you today.    Hunger Vital Signs:  Within the past 12 months we worried whether our food would run out before we got money to buy more. Never  Within the past 12 months the food we bought just didn't last and we didn't have money to get more. Never    Advance Care Directive on file? no  Advance Care Directive provided to patient? Declined.    Chief Complaint   Patient presents with    Musculoskeletal Problem       Initial /76 (BP Location: Left arm, Patient Position: Sitting, Cuff Size: Adult Large)   Pulse 78   Temp 97.3  F (36.3  C) (Tympanic)   Resp 16   Ht 1.803 m (5' 11\")   Wt 83 kg (183 lb)   SpO2 97%   BMI 25.52 kg/m   Estimated body mass index is 25.52 kg/m  as calculated from the following:    Height as of this encounter: 1.803 m (5' 11\").    Weight as of this encounter: 83 kg (183 lb).  Medication Reconciliation: complete        Essence Sawyer LPN     "

## 2023-11-12 NOTE — PROGRESS NOTES
ASSESSMENT/PLAN:    I have reviewed the nursing notes.  I have reviewed the findings, diagnosis, plan and need for follow up with the patient.    1. Rib contusion, right, initial encounter  2. Rib pain on right side  - XR Ribs & Chest Rt 3vw    Patient presents with right rib pain after resting a heavy object on his chest at work a little over a week ago.  Patient does not want to open a Workmen's Comp. case at this time.  Right rib and chest x-ray was negative for any fractures, dislocations, or any other abnormalities.  Discussed results with patient in clinic.  Discussed that his pain is most likely due to a rib contusion.  Discussed that the lump could possibly be a small hematoma.  Advised patient to alternate between ice and heat. May use over-the-counter Tylenol or ibuprofen PRN.  Advised patient that it is important that he does not restrict his breathing due to the pain as this can increase his risk of pneumonia.    Discussed warning signs/symptoms indicative of need to f/u    Follow up if symptoms persist or worsen or concerns    I explained my diagnostic considerations and recommendations to the patient, who voiced understanding and agreement with the treatment plan. All questions were answered. We discussed potential side effects of any prescribed or recommended therapies, as well as expectations for response to treatments.    Shahriar Renee, MEHRAN CNP  11/12/2023  9:30 AM    HPI:    Adan BARNES Willow is a 38 year old male  who [facility] for concerns of right rib pain    Patient states that he was at work lifting something heavy and carrying the weight of the object on his chest. He states this happened last week. He states that he felt a pop in his right ribs and then felt pain. He denies any prior injuries to his ribs. He states it hurts to take deep breaths and sit up straight. He has taken ibuprofen and tylenol for the pain with minimal relief. He denies any edema, redness, or  "bruising. He states he can feel a small \"lump\" on his ribs where the pain is located.     Past Medical History:   Diagnosis Date    Hypertension     Pain in joint, lower leg     hx of    Personal history of other specified diseases     No Comments Provided     Past Surgical History:   Procedure Laterality Date    GI SURGERY      OTHER SURGICAL HISTORY      [id number],MASTOIDECTOMY,and reconstruction, multiple ear surgeries     Social History     Tobacco Use    Smoking status: Former     Packs/day: 1.00     Years: 8.00     Additional pack years: 0.00     Total pack years: 8.00     Types: Cigarettes     Quit date: 2010     Years since quittin.3    Smokeless tobacco: Former     Types: Chew    Tobacco comments:     Quit smoking: Quit 3 years ago   Substance Use Topics    Alcohol use: Yes     Comment: occasional     Current Outpatient Medications   Medication Sig Dispense Refill    acetaminophen (TYLENOL) 325 MG tablet Take 650 mg by mouth every 4 hours as needed       ibuprofen (ADVIL/MOTRIN) 200 MG tablet Take 200 mg by mouth every 4 hours as needed       lisinopril (ZESTRIL) 10 MG tablet Take 30 mg by mouth       Allergies   Allergen Reactions    Amoxicillin Other (See Comments)     Childhood allergy    Penicillins Other (See Comments)     Childhood allergy     Past medical history, past surgical history, current medications and allergies reviewed and accurate to the best of my knowledge.      ROS:  Refer to HPI    /76 (BP Location: Left arm, Patient Position: Sitting, Cuff Size: Adult Large)   Pulse 78   Temp 97.3  F (36.3  C) (Tympanic)   Resp 16   Ht 1.803 m (5' 11\")   Wt 83 kg (183 lb)   SpO2 97%   BMI 25.52 kg/m      EXAM:  General Appearance: Well appearing 38 year old male, appropriate appearance for age. No acute distress   Respiratory: normal chest wall and respirations.  Normal effort.  Clear to auscultation bilaterally, no wheezing, crackles or rhonchi.  No increased work of breathing.  No " cough appreciated.  Cardiac: RRR with no murmurs  Musculoskeletal:  Equal movement of bilateral upper extremities.  Equal movement of bilateral lower extremities.  Normal gait. Tenderness with palpation of right anterior 8th/9th rib just below right breast. Small firm mass felt about 1 inch below nipple on rib.   Dermatological: no rashes noted of exposed skin  Neuro: Alert and oriented to person, place, and time.    Psychological: normal affect, alert, oriented, and pleasant.     Xray:  Results for orders placed or performed in visit on 11/12/23   XR Ribs & Chest Rt 3vw     Status: None    Narrative    XR RIBS & CHEST RT 3VW, 11/12/2023 9:46 AM    History: Male, age 38 years; pain of right anterior 8th/9th rib; Rib  pain on right side    Comparison: No relevant prior imaging.    Technique: Single view of the chest and three views of the right ribs.    FINDINGS: The cardiac silhouette is within normal limits. The  pulmonary vasculature is within normal limits. Evaluation of the ribs  demonstrates no fracture.  No pneumothorax. There is a skin marker  seen along the anterior aspect of the right sixth rib.      Impression    IMPRESSION:   No acute fracture nor evidence of pneumothorax.    SUE RANKIN MD         SYSTEM ID:  [id number].

## 2023-12-17 ENCOUNTER — HEALTH MAINTENANCE LETTER (OUTPATIENT)
Age: 38
End: 2023-12-17

## 2024-10-21 ENCOUNTER — OFFICE VISIT (OUTPATIENT)
Dept: FAMILY MEDICINE | Facility: OTHER | Age: 39
End: 2024-10-21
Attending: STUDENT IN AN ORGANIZED HEALTH CARE EDUCATION/TRAINING PROGRAM
Payer: COMMERCIAL

## 2024-10-21 VITALS
WEIGHT: 183.6 LBS | HEIGHT: 71 IN | OXYGEN SATURATION: 99 % | DIASTOLIC BLOOD PRESSURE: 73 MMHG | BODY MASS INDEX: 25.7 KG/M2 | HEART RATE: 55 BPM | SYSTOLIC BLOOD PRESSURE: 131 MMHG | RESPIRATION RATE: 16 BRPM | TEMPERATURE: 97.7 F

## 2024-10-21 DIAGNOSIS — H92.01 ACUTE EAR PAIN, RIGHT: ICD-10-CM

## 2024-10-21 DIAGNOSIS — R04.0 EPISTAXIS: Primary | ICD-10-CM

## 2024-10-21 PROCEDURE — 99213 OFFICE O/P EST LOW 20 MIN: CPT

## 2024-10-21 ASSESSMENT — PAIN SCALES - GENERAL: PAINLEVEL: NO PAIN (0)

## 2024-10-21 NOTE — NURSING NOTE
"Chief Complaint   Patient presents with    Nose Bleeds    Ear Problem     Right ear   Patient  p[facility] today for concerns of nose bleeds and ear pain. Patient states he has had 5 total nose bleeds since yesterday, 2 of them being today. Patient also states his right ear has been hurting for 2-3 days.     Initial /73 (BP Location: Right arm, Patient Position: Sitting, Cuff Size: Adult Regular)   Pulse 55   Temp 97.7  F (36.5  C) (Temporal)   Resp 16   Ht 1.803 m (5' 11\")   Wt 83.3 kg (183 lb 9.6 oz)   SpO2 99%   BMI 25.61 kg/m   Estimated body mass index is 25.61 kg/m  as calculated from the following:    Height as of this encounter: 1.803 m (5' 11\").    Weight as of this encounter: 83.3 kg (183 lb 9.6 oz).  Medication Review: complete    The next two questions are to help us understand your food security.  If you are feeling you need any assistance in this area, we have resources available to support you today.          10/21/2024   SDOH- Food Insecurity   Within the past 12 months, did you worry that your food would run out before you got money to buy more? N   Within the past 12 months, did the food you bought just not last and you didn t have money to get more? N            Health Care Directive:  Patient does not have a Health Care Directive or Living Will: Discussed advance care planning with patient; however, patient declined at this time.    Gabriel Montilla      "

## 2024-10-21 NOTE — PROGRESS NOTES
ASSESSMENT/PLAN:    I have reviewed the nursing notes.  I have reviewed the findings, diagnosis, plan and need for follow up with the patient.    1. Epistaxis  - Adult ENT  Referral; Future    Patient presents with concerns of epistaxis.  Patient's vitals are stable. Patient has experienced 5 episodes since yesterday and he has no history of prior nosebleeds.  He denies any trauma to his nose.  He states he does drink alcohol a few days a week and occasional tobacco use.  He is not on any blood thinners.  Discussed with patient that the nosebleeds could be due to dryness.  Gave patient information on how to prevent and treat nosebleeds at home including the use of Afrin.  Placed a referral to ENT for further evaluation.    2. Acute ear pain, right    Reassured patient that based on his physical exam there are currently no signs of infection in either ear.  May take Tylenol and ibuprofen as needed.    Discussed warning signs/symptoms indicative of need to f/u    Follow up if symptoms persist or worsen or concerns    I explained my diagnostic considerations and recommendations to the patient, who voiced understanding and agreement with the treatment plan. All questions were answered. We discussed potential side effects of any prescribed or recommended therapies, as well as expectations for response to treatments.    MEHRAN Frederick CNP  10/21/2024  2:35 PM    HPI:    Adan BARNES Satishata is a 39 year old male  who [facility] for concerns of epistaxis and ear pain    right ear pain x 3 days duration.     Presence of the following:   No fevers or chills.   No sore throat/pharyngitis/tonsillitis.   No allergy/URI Symptoms  No Muffled Sounds/Change in Hearing  No Sensation of Fullness in Ear(s)  No Ringing in Ears/Tinnitus  No Balance Changes  No Dizziness  No Headache   No Ear Drainage  Additional Symptoms: Yes: patient has had 5 nosebleeds since yesterday, he has no hx of epistaxis and no  trauma to his nose, two of the episodes lasted 1-2 hours, he has been using tissues to help the bleeding  Denies persistent hearing loss, foul smelling odor from ear, changes in vision, nausea, vomiting, diarrhea, chest pain, shortness of breath.     No Recent swimming/hot tub  No submerging of head in shower/bathtub.     No Recent URI or other illness  History of otitis media: Yes  History of HEENT surgery (PE tubes, tonsillectomy/adenoidectomy, etc.): Yes  Recent Course of ABX: No    Treatments Tried: none  Prior History of Similar Symptoms: Yes    PCP - [facility]    Past Medical History:   Diagnosis Date    Hypertension     Pain in joint, lower leg     hx of    Personal history of other specified diseases     No Comments Provided     Past Surgical History:   Procedure Laterality Date    GI SURGERY      OTHER SURGICAL HISTORY      [id number],MASTOIDECTOMY,and reconstruction, multiple ear surgeries     Social History     Tobacco Use    Smoking status: Former     Current packs/day: 0.00     Average packs/day: 1 pack/day for 8.0 years (8.0 ttl pk-yrs)     Types: Cigarettes     Start date: 2002     Quit date: 2010     Years since quittin.3    Smokeless tobacco: Former     Types: Chew    Tobacco comments:     Quit smoking: Quit 3 years ago   Substance Use Topics    Alcohol use: Yes     Comment: occasional     Current Outpatient Medications   Medication Sig Dispense Refill    acetaminophen (TYLENOL) 325 MG tablet Take 650 mg by mouth every 4 hours as needed       ibuprofen (ADVIL/MOTRIN) 200 MG tablet Take 200 mg by mouth every 4 hours as needed       lisinopril (ZESTRIL) 10 MG tablet Take 30 mg by mouth       Allergies   Allergen Reactions    Amoxicillin Other (See Comments)     Childhood allergy    Penicillins Other (See Comments)     Childhood allergy     Past medical history, past surgical history, current medications and allergies reviewed and accurate to the best of my knowledge.      ROS:  Refer to  "HPI    /73 (BP Location: Right arm, Patient Position: Sitting, Cuff Size: Adult Regular)   Pulse 55   Temp 97.7  F (36.5  C) (Temporal)   Resp 16   Ht 1.803 m (5' 11\")   Wt 83.3 kg (183 lb 9.6 oz)   SpO2 99%   BMI 25.61 kg/m      EXAM:  General Appearance: Well appearing 39 year old male, appropriate appearance for age. No acute distress   Ears: Left TM intact, translucent with bony landmarks appreciated, no erythema, no effusion, no bulging, no purulence.  Right TM intact, translucent with bony landmarks appreciated, no erythema, no effusion, no bulging, no purulence.  Left auditory canal clear.  Right auditory canal clear.  Normal external ears, non tender.  Eyes: conjunctivae normal without erythema or irritation, corneas clear, no drainage or crusting, no eyelid swelling, pupils equal   Oropharynx: moist mucous membranes, posterior pharynx without erythema, tonsils symmetric and 1+, no erythema, no exudates or petechiae, no post nasal drip seen, no trismus, voice clear.    Sinuses:  No sinus tenderness upon palpation of the frontal or maxillary sinuses  Nose:  Bilateral nares: mild erythema, no edema, no drainage or congestion, scant amount of dried blood noted in nostrils   Neuro: Alert and oriented to person, place, and time.    Psychological: normal affect, alert, oriented, and pleasant.       "

## 2025-01-12 ENCOUNTER — HEALTH MAINTENANCE LETTER (OUTPATIENT)
Age: 40
End: 2025-01-12

## 2025-03-17 ENCOUNTER — HOSPITAL ENCOUNTER (EMERGENCY)
Facility: OTHER | Age: 40
Discharge: HOME | End: 2025-03-17
Attending: STUDENT IN AN ORGANIZED HEALTH CARE EDUCATION/TRAINING PROGRAM
Payer: COMMERCIAL

## 2025-03-17 VITALS
BODY MASS INDEX: 25.9 KG/M2 | SYSTOLIC BLOOD PRESSURE: 134 MMHG | HEART RATE: 101 BPM | DIASTOLIC BLOOD PRESSURE: 97 MMHG | OXYGEN SATURATION: 99 % | HEIGHT: 71 IN | RESPIRATION RATE: 18 BRPM | TEMPERATURE: 97.6 F | WEIGHT: 185 LBS

## 2025-03-17 DIAGNOSIS — S61.216A LACERATION OF RIGHT LITTLE FINGER WITHOUT FOREIGN BODY WITHOUT DAMAGE TO NAIL, INITIAL ENCOUNTER: ICD-10-CM

## 2025-03-17 DIAGNOSIS — S61.214A LACERATION OF RIGHT RING FINGER WITHOUT FOREIGN BODY WITHOUT DAMAGE TO NAIL, INITIAL ENCOUNTER: ICD-10-CM

## 2025-03-17 PROCEDURE — 99207 PR NO CHARGE LOS: CPT | Performed by: STUDENT IN AN ORGANIZED HEALTH CARE EDUCATION/TRAINING PROGRAM

## 2025-03-17 PROCEDURE — 99283 EMERGENCY DEPT VISIT LOW MDM: CPT | Mod: 25 | Performed by: STUDENT IN AN ORGANIZED HEALTH CARE EDUCATION/TRAINING PROGRAM

## 2025-03-17 PROCEDURE — 250N000009 HC RX 250: Performed by: STUDENT IN AN ORGANIZED HEALTH CARE EDUCATION/TRAINING PROGRAM

## 2025-03-17 PROCEDURE — 12001 RPR S/N/AX/GEN/TRNK 2.5CM/<: CPT | Performed by: STUDENT IN AN ORGANIZED HEALTH CARE EDUCATION/TRAINING PROGRAM

## 2025-03-17 PROCEDURE — 90471 IMMUNIZATION ADMIN: CPT | Performed by: STUDENT IN AN ORGANIZED HEALTH CARE EDUCATION/TRAINING PROGRAM

## 2025-03-17 PROCEDURE — 250N000011 HC RX IP 250 OP 636: Performed by: STUDENT IN AN ORGANIZED HEALTH CARE EDUCATION/TRAINING PROGRAM

## 2025-03-17 PROCEDURE — 90715 TDAP VACCINE 7 YRS/> IM: CPT | Performed by: STUDENT IN AN ORGANIZED HEALTH CARE EDUCATION/TRAINING PROGRAM

## 2025-03-17 RX ORDER — GINSENG 100 MG
500 CAPSULE ORAL ONCE
Status: COMPLETED | OUTPATIENT
Start: 2025-03-17 | End: 2025-03-17

## 2025-03-17 RX ADMIN — CLOSTRIDIUM TETANI TOXOID ANTIGEN (FORMALDEHYDE INACTIVATED), CORYNEBACTERIUM DIPHTHERIAE TOXOID ANTIGEN (FORMALDEHYDE INACTIVATED), BORDETELLA PERTUSSIS TOXOID ANTIGEN (GLUTARALDEHYDE INACTIVATED), BORDETELLA PERTUSSIS FILAMENTOUS HEMAGGLUTININ ANTIGEN (FORMALDEHYDE INACTIVATED), BORDETELLA PERTUSSIS PERTACTIN ANTIGEN, AND BORDETELLA PERTUSSIS FIMBRIAE 2/3 ANTIGEN 0.5 ML: 5; 2; 2.5; 5; 3; 5 INJECTION, SUSPENSION INTRAMUSCULAR at 03:28

## 2025-03-17 RX ADMIN — BACITRACIN 1 G: 500 OINTMENT TOPICAL at 03:27

## 2025-03-17 ASSESSMENT — ACTIVITIES OF DAILY LIVING (ADL): ADLS_ACUITY_SCORE: 41

## 2025-03-17 ASSESSMENT — COLUMBIA-SUICIDE SEVERITY RATING SCALE - C-SSRS
1. IN THE PAST MONTH, HAVE YOU WISHED YOU WERE DEAD OR WISHED YOU COULD GO TO SLEEP AND NOT WAKE UP?: NO
2. HAVE YOU ACTUALLY HAD ANY THOUGHTS OF KILLING YOURSELF IN THE PAST MONTH?: NO
6. HAVE YOU EVER DONE ANYTHING, STARTED TO DO ANYTHING, OR PREPARED TO DO ANYTHING TO END YOUR LIFE?: NO
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
## 2025-03-17 NOTE — DISCHARGE INSTRUCTIONS
Keep wound dry for 24 hours and then wash normally with soap and water. Dry gently and avoid disrupting sutures. Avoid dirty environments. Apply bacitracin (topical antibiotic) to wound with each dressing change for first 3-5 days. Examine for signs of infection (white/green/yellow discharge, worsening redness, inflammation or pain) daily and return to emergency department if any of these are present. Get 3 sutures removed in 7-10 days. Please schedule nurse-only clinic appointment at ER registration desk on your way out of the ER, or coordinate suture removal with your primary care provider (PCP) if you prefer to have this performed outside of [facility]. Wounds can take up to 1 year to reach their final long-term appearance.
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
## 2025-07-20 ENCOUNTER — OFFICE VISIT (OUTPATIENT)
Dept: FAMILY MEDICINE | Facility: OTHER | Age: 40
End: 2025-07-20
Payer: COMMERCIAL

## 2025-07-20 VITALS
DIASTOLIC BLOOD PRESSURE: 88 MMHG | RESPIRATION RATE: 16 BRPM | HEART RATE: 56 BPM | SYSTOLIC BLOOD PRESSURE: 136 MMHG | BODY MASS INDEX: 26.98 KG/M2 | WEIGHT: 182.2 LBS | OXYGEN SATURATION: 99 % | TEMPERATURE: 97.7 F | HEIGHT: 69 IN

## 2025-07-20 DIAGNOSIS — K64.4 EXTERNAL HEMORRHOIDS: Primary | ICD-10-CM

## 2025-07-20 RX ORDER — HYDROCORTISONE 25 MG/G
CREAM TOPICAL 2 TIMES DAILY PRN
Qty: 30 G | Refills: 0 | Status: SHIPPED | OUTPATIENT
Start: 2025-07-20

## 2025-07-20 RX ORDER — HYDROCODONE BITARTRATE AND ACETAMINOPHEN 5; 325 MG/1; MG/1
1 TABLET ORAL EVERY 6 HOURS PRN
Qty: 10 TABLET | Refills: 0 | Status: SHIPPED | OUTPATIENT
Start: 2025-07-20 | End: 2025-07-23

## 2025-07-20 RX ORDER — LISINOPRIL 30 MG/1
30 TABLET ORAL DAILY
COMMUNITY
Start: 2025-06-25

## 2025-07-20 ASSESSMENT — PAIN SCALES - GENERAL: PAINLEVEL_OUTOF10: SEVERE PAIN (7)

## 2025-07-20 NOTE — PATIENT INSTRUCTIONS
Increase your fiber intake.  Do not sit on the toilet for more than 60 seconds  Let us know if pain gets worse or any new symptoms.  If you have significant bleeding that will not stop you need to come in for urgent evaluation

## 2025-07-20 NOTE — NURSING NOTE
"Chief Complaint   Patient presents with    Rectal Problem       Initial /88   Pulse 56   Temp 97.7  F (36.5  C) (Tympanic)   Resp 16   Ht 1.753 m (5' 9\")   Wt 82.6 kg (182 lb 3.2 oz)   SpO2 99%   BMI 26.91 kg/m   Estimated body mass index is 26.91 kg/m  as calculated from the following:    Height as of this encounter: 1.753 m (5' 9\").    Weight as of this encounter: 82.6 kg (182 lb 3.2 oz).  Medication Review: complete    The next two questions are to help us understand your food security.  If you are feeling you need any assistance in this area, we have resources available to support you today.          10/21/2024   SDOH- Food Insecurity   Within the past 12 months, did you worry that your food would run out before you got money to buy more? N   Within the past 12 months, did the food you bought just not last and you didn t have money to get more? N        Data saved with a previous flowsheet row definition         Health Care Directive:  Patient does not have a Health Care Directive: Discussed advance care planning with patient; however, patient declined at this time.    Norma J. Gosselin, LPN      "

## 2025-07-22 NOTE — PROGRESS NOTES
"  Assessment & Plan     (K64.4) External hemorrhoids  (primary encounter diagnosis)  Comment: Patient with painful external hemorrhoids.  Discussed with him increasing his fiber intake trying to minimize time sitting on the toilet.  Will prescribe Proctocort to use topically to help with symptoms..  Also will prescribe small quantity of hydrocodone to help him get through the time.  I did offer to nickie the hemorrhoid and he declined.  Given the duration of symptoms and the fact that they are draining I think making an incision would be of minimal benefit away.    Plan: hydrocortisone, Perianal, (HYDROCORTISONE) 2.5         % cream, HYDROcodone-acetaminophen (NORCO)         5-325 MG tablet      BMI  Estimated body mass index is 26.91 kg/m  as calculated from the following:    Height as of this encounter: 1.753 m (5' 9\").    Weight as of this encounter: 82.6 kg (182 lb 3.2 oz).           Sneha Ruffin is a 40 year old, presenting for the following health issues:  Rectal Problem    HPI    Patient comes in due to a 4-day history of rectal pain.  He reports Thursday night noting pressure in his rectum and developing a painful hemorrhoid that did bleed.  It has bled intermittently since then.  He has had hemorrhoids in the past.  This time the pain was more severe than previous.  He has been using Tucks wipes.  Having difficulty sitting or sleeping.  He reports no issues with constipation.  He does not sit on the toilet for longer than a minute or 2 with bowel movements.  He has been using over-the-counter pain medication with no relief.    No symptoms of anemia.  No history of high volume blood loss.    He reports his brothers all have similar issues with hemorrhoids.                Objective    /88   Pulse 56   Temp 97.7  F (36.5  C) (Tympanic)   Resp 16   Ht 1.753 m (5' 9\")   Wt 82.6 kg (182 lb 3.2 oz)   SpO2 99%   BMI 26.91 kg/m    Body mass index is 26.91 kg/m .  Physical Exam   Abdomen: Abdomen " soft, nontender.  No masses.  Rectal: Patient has 2 moderately sized hemorrhoids with scant bleeding.  They are tender.            Signed Electronically by: Dandre Duarte MD

## 2025-08-11 ENCOUNTER — OFFICE VISIT (OUTPATIENT)
Dept: SURGERY | Facility: OTHER | Age: 40
End: 2025-08-11
Attending: SURGERY
Payer: COMMERCIAL

## 2025-08-11 VITALS
SYSTOLIC BLOOD PRESSURE: 132 MMHG | TEMPERATURE: 98.1 F | HEART RATE: 83 BPM | DIASTOLIC BLOOD PRESSURE: 82 MMHG | BODY MASS INDEX: 26.52 KG/M2 | OXYGEN SATURATION: 98 % | WEIGHT: 179.6 LBS

## 2025-08-11 DIAGNOSIS — K60.2 ANAL FISSURE: Primary | ICD-10-CM

## 2025-08-11 DIAGNOSIS — K64.9 BLEEDING HEMORRHOIDS: ICD-10-CM

## 2025-08-11 DIAGNOSIS — K64.4 EXTERNAL HEMORRHOIDS: ICD-10-CM

## 2025-08-11 PROCEDURE — 3079F DIAST BP 80-89 MM HG: CPT | Performed by: SURGERY

## 2025-08-11 PROCEDURE — 3075F SYST BP GE 130 - 139MM HG: CPT | Performed by: SURGERY

## 2025-08-11 PROCEDURE — 1126F AMNT PAIN NOTED NONE PRSNT: CPT | Performed by: SURGERY

## 2025-08-11 PROCEDURE — 99203 OFFICE O/P NEW LOW 30 MIN: CPT | Performed by: SURGERY

## 2025-08-11 ASSESSMENT — PAIN SCALES - GENERAL: PAINLEVEL_OUTOF10: NO PAIN (0)

## (undated) RX ORDER — LIDOCAINE HYDROCHLORIDE 10 MG/ML
INJECTION, SOLUTION EPIDURAL; INFILTRATION; INTRACAUDAL; PERINEURAL
Status: DISPENSED
Start: 2025-03-17

## (undated) RX ORDER — NEOMYCIN/BACITRACIN/POLYMYXINB 3.5-400-5K
OINTMENT (GRAM) TOPICAL
Status: DISPENSED
Start: 2025-03-17

## (undated) RX ORDER — GINSENG 100 MG
CAPSULE ORAL
Status: DISPENSED
Start: 2025-03-17

## (undated) RX ORDER — LIDOCAINE HYDROCHLORIDE 20 MG/ML
SOLUTION OROPHARYNGEAL
Status: DISPENSED
Start: 2021-03-03